# Patient Record
Sex: FEMALE | Race: WHITE | NOT HISPANIC OR LATINO
[De-identification: names, ages, dates, MRNs, and addresses within clinical notes are randomized per-mention and may not be internally consistent; named-entity substitution may affect disease eponyms.]

---

## 2020-10-07 ENCOUNTER — TRANSCRIPTION ENCOUNTER (OUTPATIENT)
Age: 25
End: 2020-10-07

## 2020-11-30 ENCOUNTER — TRANSCRIPTION ENCOUNTER (OUTPATIENT)
Age: 25
End: 2020-11-30

## 2021-01-01 ENCOUNTER — TRANSCRIPTION ENCOUNTER (OUTPATIENT)
Age: 26
End: 2021-01-01

## 2021-01-02 ENCOUNTER — EMERGENCY (EMERGENCY)
Facility: HOSPITAL | Age: 26
LOS: 0 days | Discharge: HOME | End: 2021-01-02
Attending: STUDENT IN AN ORGANIZED HEALTH CARE EDUCATION/TRAINING PROGRAM | Admitting: STUDENT IN AN ORGANIZED HEALTH CARE EDUCATION/TRAINING PROGRAM
Payer: MEDICAID

## 2021-01-02 VITALS
RESPIRATION RATE: 16 BRPM | OXYGEN SATURATION: 99 % | DIASTOLIC BLOOD PRESSURE: 55 MMHG | TEMPERATURE: 98 F | HEART RATE: 99 BPM | SYSTOLIC BLOOD PRESSURE: 140 MMHG

## 2021-01-02 DIAGNOSIS — M72.2 PLANTAR FASCIAL FIBROMATOSIS: ICD-10-CM

## 2021-01-02 DIAGNOSIS — M79.671 PAIN IN RIGHT FOOT: ICD-10-CM

## 2021-01-02 PROCEDURE — 73630 X-RAY EXAM OF FOOT: CPT | Mod: 26,RT

## 2021-01-02 PROCEDURE — 99283 EMERGENCY DEPT VISIT LOW MDM: CPT

## 2021-01-02 RX ORDER — IBUPROFEN 200 MG
1 TABLET ORAL
Qty: 15 | Refills: 0
Start: 2021-01-02 | End: 2021-01-06

## 2021-01-02 NOTE — ED PROVIDER NOTE - PATIENT PORTAL LINK FT
You can access the FollowMyHealth Patient Portal offered by [facility] by registering at the following website: [email or web address]. By joining [employer]’s FollowMyHealth portal, you will also be able to view your health information using other applications (apps) compatible with our system.

## 2021-01-02 NOTE — ED PROVIDER NOTE - OBJECTIVE STATEMENT
atraumatic right foot pain x 2 days. Pain worse in morning. Pain with wt bearing. No fever or swelling.

## 2021-01-02 NOTE — ED PROVIDER NOTE - WR INTERPRETATION 1
Exposed to covid on Friday. Then on Sunday, c/o headache, scratchy throat and chills. Denies fevers. Here for covid testing.
MSK XR negative - No fracture, No dislocation

## 2021-01-02 NOTE — ED ADULT TRIAGE NOTE - CHIEF COMPLAINT QUOTE
pt presents to E Dc/o right foot pain , denies ant trauma, small bruise noted to top of foot, pt states that is not where pain is c/o pain to left pinky toe an down , +pedal pulses

## 2021-01-02 NOTE — ED ADULT NURSE NOTE - NSIMPLEMENTINTERV_GEN_ALL_ED
Implemented All Universal Safety Interventions:  [place] to call system. Call bell, personal items and telephone within reach. Instruct patient to call for assistance. Room bathroom lighting operational. Non-slip footwear when patient is off stretcher. Physically safe environment: no spills, clutter or unnecessary equipment. Stretcher in lowest position, wheels locked, appropriate side rails in place.

## 2021-01-02 NOTE — ED ADULT NURSE NOTE - OBJECTIVE STATEMENT
patient presents to the emergency department with complaints of left foot pain. patient states happened sunday and radiates up to her knee. patient states she went to urgent care yesterday but does not know if an xray was taken. pt able to move foot and ankle but has weakness in moving pinky toe. denies allergies.

## 2021-01-02 NOTE — ED PROVIDER NOTE - NSFOLLOWUPCLINICS_GEN_ALL_ED_FT
[facility]  Podiatry  .  [place]   Phone: [phone]  Fax:   Follow Up Time: 4-6 Days

## 2021-01-02 NOTE — ED PROVIDER NOTE - ATTENDING CONTRIBUTION TO CARE
I personally evaluated the patient. I reviewed the Resident’s or Physician Assistant’s note (as assigned above), and agree with the findings and plan except as documented in my note.  25 year old female no pmh presents here c/o atraumatic right foot pain x 1 week. Patient states she woke up on sunday of last week and began having pain. Pain improves with rest but is worse upon waking up in the morning  and increases to 8/10 with walking. No fever or chills no trauma no numbness/tingling.  On exam  CONSTITUTIONAL: WA / WN / NAD  HEAD: NCAT  EYES: PERRL; EOMI;   MSK/EXT: No gross deformities; full range of motion. +ttp sole of right foot. No erythema no swelling no abrasions/lacerations.   SKIN: Warm and dry;   NEURO: AAOx3  PSYCH: Memory Intact, Normal Affect

## 2021-01-02 NOTE — ED PROVIDER NOTE - PHYSICAL EXAMINATION
CONST: Well appearing in NAD  MS: Right foot with plantar arch tenderness. No swelling or redness. There is one plantar wart also noted. NV intact. FROM  SKIN: Warm, dry, no acute rashes. Good turgor  NEURO: A&Ox3, No focal deficits. Strength 5/5 with no sensory deficits. Steady gait

## 2021-09-03 ENCOUNTER — TRANSCRIPTION ENCOUNTER (OUTPATIENT)
Age: 26
End: 2021-09-03

## 2022-04-16 ENCOUNTER — EMERGENCY (EMERGENCY)
Facility: HOSPITAL | Age: 27
LOS: 0 days | Discharge: HOME | End: 2022-04-17
Attending: STUDENT IN AN ORGANIZED HEALTH CARE EDUCATION/TRAINING PROGRAM | Admitting: STUDENT IN AN ORGANIZED HEALTH CARE EDUCATION/TRAINING PROGRAM
Payer: MEDICAID

## 2022-04-16 VITALS
TEMPERATURE: 97 F | SYSTOLIC BLOOD PRESSURE: 132 MMHG | OXYGEN SATURATION: 100 % | HEART RATE: 105 BPM | RESPIRATION RATE: 20 BRPM | WEIGHT: 149.91 LBS | DIASTOLIC BLOOD PRESSURE: 61 MMHG

## 2022-04-16 DIAGNOSIS — O23.41 UNSPECIFIED INFECTION OF URINARY TRACT IN PREGNANCY, FIRST TRIMESTER: ICD-10-CM

## 2022-04-16 DIAGNOSIS — R10.9 UNSPECIFIED ABDOMINAL PAIN: ICD-10-CM

## 2022-04-16 DIAGNOSIS — O99.891 OTHER SPECIFIED DISEASES AND CONDITIONS COMPLICATING PREGNANCY: ICD-10-CM

## 2022-04-16 DIAGNOSIS — M54.9 DORSALGIA, UNSPECIFIED: ICD-10-CM

## 2022-04-16 DIAGNOSIS — O21.9 VOMITING OF PREGNANCY, UNSPECIFIED: ICD-10-CM

## 2022-04-16 DIAGNOSIS — Z3A.08 8 WEEKS GESTATION OF PREGNANCY: ICD-10-CM

## 2022-04-16 LAB
ALBUMIN SERPL ELPH-MCNC: 4.2 G/DL — SIGNIFICANT CHANGE UP (ref 3.5–5.2)
ALP SERPL-CCNC: 115 U/L — SIGNIFICANT CHANGE UP (ref 30–115)
ALT FLD-CCNC: 46 U/L — HIGH (ref 0–41)
ANION GAP SERPL CALC-SCNC: 12 MMOL/L — SIGNIFICANT CHANGE UP (ref 7–14)
APPEARANCE UR: ABNORMAL
AST SERPL-CCNC: 23 U/L — SIGNIFICANT CHANGE UP (ref 0–41)
BACTERIA # UR AUTO: NEGATIVE — SIGNIFICANT CHANGE UP
BASOPHILS # BLD AUTO: 0.03 K/UL — SIGNIFICANT CHANGE UP (ref 0–0.2)
BASOPHILS NFR BLD AUTO: 0.3 % — SIGNIFICANT CHANGE UP (ref 0–1)
BILIRUB SERPL-MCNC: 0.3 MG/DL — SIGNIFICANT CHANGE UP (ref 0.2–1.2)
BILIRUB UR-MCNC: NEGATIVE — SIGNIFICANT CHANGE UP
BUN SERPL-MCNC: 10 MG/DL — SIGNIFICANT CHANGE UP (ref 10–20)
CALCIUM SERPL-MCNC: 9.3 MG/DL — SIGNIFICANT CHANGE UP (ref 8.5–10.1)
CHLORIDE SERPL-SCNC: 103 MMOL/L — SIGNIFICANT CHANGE UP (ref 98–110)
CO2 SERPL-SCNC: 21 MMOL/L — SIGNIFICANT CHANGE UP (ref 17–32)
COLOR SPEC: YELLOW — SIGNIFICANT CHANGE UP
CREAT SERPL-MCNC: 0.5 MG/DL — LOW (ref 0.7–1.5)
DIFF PNL FLD: NEGATIVE — SIGNIFICANT CHANGE UP
EGFR: 133 ML/MIN/1.73M2 — SIGNIFICANT CHANGE UP
EOSINOPHIL # BLD AUTO: 0.06 K/UL — SIGNIFICANT CHANGE UP (ref 0–0.7)
EOSINOPHIL NFR BLD AUTO: 0.5 % — SIGNIFICANT CHANGE UP (ref 0–8)
EPI CELLS # UR: 7 /HPF — HIGH (ref 0–5)
GLUCOSE SERPL-MCNC: 110 MG/DL — HIGH (ref 70–99)
GLUCOSE UR QL: NEGATIVE — SIGNIFICANT CHANGE UP
HCG SERPL-ACNC: HIGH MIU/ML
HCT VFR BLD CALC: 32 % — LOW (ref 37–47)
HGB BLD-MCNC: 10.5 G/DL — LOW (ref 12–16)
HYALINE CASTS # UR AUTO: 8 /LPF — HIGH (ref 0–7)
IMM GRANULOCYTES NFR BLD AUTO: 0.4 % — HIGH (ref 0.1–0.3)
KETONES UR-MCNC: SIGNIFICANT CHANGE UP
LEUKOCYTE ESTERASE UR-ACNC: ABNORMAL
LYMPHOCYTES # BLD AUTO: 2.28 K/UL — SIGNIFICANT CHANGE UP (ref 1.2–3.4)
LYMPHOCYTES # BLD AUTO: 20.3 % — LOW (ref 20.5–51.1)
MCHC RBC-ENTMCNC: 24.9 PG — LOW (ref 27–31)
MCHC RBC-ENTMCNC: 32.8 G/DL — SIGNIFICANT CHANGE UP (ref 32–37)
MCV RBC AUTO: 76 FL — LOW (ref 81–99)
MONOCYTES # BLD AUTO: 0.68 K/UL — HIGH (ref 0.1–0.6)
MONOCYTES NFR BLD AUTO: 6.1 % — SIGNIFICANT CHANGE UP (ref 1.7–9.3)
NEUTROPHILS # BLD AUTO: 8.13 K/UL — HIGH (ref 1.4–6.5)
NEUTROPHILS NFR BLD AUTO: 72.4 % — SIGNIFICANT CHANGE UP (ref 42.2–75.2)
NITRITE UR-MCNC: NEGATIVE — SIGNIFICANT CHANGE UP
NRBC # BLD: 0 /100 WBCS — SIGNIFICANT CHANGE UP (ref 0–0)
PH UR: 6 — SIGNIFICANT CHANGE UP (ref 5–8)
PLATELET # BLD AUTO: 258 K/UL — SIGNIFICANT CHANGE UP (ref 130–400)
POTASSIUM SERPL-MCNC: 3.7 MMOL/L — SIGNIFICANT CHANGE UP (ref 3.5–5)
POTASSIUM SERPL-SCNC: 3.7 MMOL/L — SIGNIFICANT CHANGE UP (ref 3.5–5)
PROT SERPL-MCNC: 7 G/DL — SIGNIFICANT CHANGE UP (ref 6–8)
PROT UR-MCNC: ABNORMAL
RBC # BLD: 4.21 M/UL — SIGNIFICANT CHANGE UP (ref 4.2–5.4)
RBC # FLD: 15.1 % — HIGH (ref 11.5–14.5)
RBC CASTS # UR COMP ASSIST: 6 /HPF — HIGH (ref 0–4)
SODIUM SERPL-SCNC: 136 MMOL/L — SIGNIFICANT CHANGE UP (ref 135–146)
SP GR SPEC: 1.03 — HIGH (ref 1.01–1.03)
UROBILINOGEN FLD QL: ABNORMAL
WBC # BLD: 11.23 K/UL — HIGH (ref 4.8–10.8)
WBC # FLD AUTO: 11.23 K/UL — HIGH (ref 4.8–10.8)
WBC UR QL: 12 /HPF — HIGH (ref 0–5)

## 2022-04-16 PROCEDURE — 99285 EMERGENCY DEPT VISIT HI MDM: CPT

## 2022-04-16 NOTE — ED PROVIDER NOTE - PATIENT PORTAL LINK FT
You can access the FollowMyHealth Patient Portal offered by [facility] by registering at the following website: [email or web address]. By joining [employer]’s FollowMyHealth portal, you will also be able to view your health information using other applications (apps) compatible with our system.

## 2022-04-16 NOTE — ED PROVIDER NOTE - PHYSICAL EXAMINATION
CONSTITUTIONAL: Well-developed; well-nourished; in no acute distress.   SKIN: warm, dry.  HEAD: Normocephalic; atraumatic.  EYES: PERRLA, EOMI, no conjunctival erythema.  ENT: No nasal discharge; airway clear. MMM.  NECK: Supple; non tender.  CARD: S1, S2 normal; no murmurs, gallops, or rubs. Regular rate and rhythm.   RESP: No wheezes, rales, or rhonchi. Lungs CTAB.  ABD: soft, +mild b/l lower abdominal ttp. No rebound or guarding. No CVAT b/l.  EXT: Normal ROM. No clubbing, cyanosis, or edema.  NEURO: Alert, oriented, grossly unremarkable.

## 2022-04-16 NOTE — ED PROVIDER NOTE - CARE PROVIDER_API CALL
Cintia Arcos  Obstetrics & Gynecology  [address] [place] [address]  Phone: [phone]  Fax: [phone]  Follow Up Time: 4-6 Days

## 2022-04-16 NOTE — ED PROVIDER NOTE - NSFOLLOWUPCLINICS_GEN_ALL_ED_FT
[facility]  OB/GYN  [address] [place] [address]  Phone: [phone]  Fax:

## 2022-04-16 NOTE — ED PROVIDER NOTE - NSFOLLOWUPINSTRUCTIONS_ED_ALL_ED_FT
Abdominal Pain, Adult  Abdominal pain can be caused by many things. Often, abdominal pain is not serious and it gets better with no treatment or by being treated at home. However, sometimes abdominal pain is serious. Your health care provider will do a medical history and a physical exam to try to determine the cause of your abdominal pain.    Follow these instructions at home:  Take over-the-counter and prescription medicines only as told by your health care provider. Do not take a laxative unless told by your health care provider.  ImageDrink enough fluid to keep your urine clear or pale yellow.  Watch your condition for any changes.  Keep all follow-up visits as told by your health care provider. This is important.  Contact a health care provider if:  Your abdominal pain changes or gets worse.  You are not hungry or you lose weight without trying.  You are constipated or have diarrhea for more than 2–3 days.  You have pain when you urinate or have a bowel movement.  Your abdominal pain wakes you up at night.  Your pain gets worse with meals, after eating, or with certain foods.  You are throwing up and cannot keep anything down.  You have a fever.  Get help right away if:  Your pain does not go away as soon as your health care provider told you to expect.  You cannot stop throwing up.  Your pain is only in areas of the abdomen, such as the right side or the left lower portion of the abdomen.  You have bloody or black stools, or stools that look like tar.  You have severe pain, cramping, or bloating in your abdomen.  You have signs of dehydration, such as:    Dark urine, very little urine, or no urine.  Cracked lips.  Dry mouth.  Sunken eyes.  Sleepiness.  Weakness.    This information is not intended to replace advice given to you by your health care provider. Make sure you discuss any questions you have with your health care provider.      Urinary Tract Infection    A urinary tract infection (UTI) is an infection of any part of the urinary tract, which includes the kidneys, ureters, bladder, and urethra. Risk factors include ignoring your need to urinate, wiping back to front if female, being an uncircumcised male, and having diabetes or a weak immune system. Symptoms include frequent urination, pain or burning with urination, foul smelling urine, cloudy urine, pain in the lower abdomen, blood in the urine, and fever. If you were prescribed an antibiotic medicine, take it as told by your health care provider. Do not stop taking the antibiotic even if you start to feel better.    SEEK IMMEDIATE MEDICAL CARE IF YOU HAVE THE FOLLOWING SYMPTOMS: severe back or abdominal pain, inability to keep fluids or medicine down, dizziness/lightheadedness, or a change in mental status.

## 2022-04-16 NOTE — ED PROVIDER NOTE - CLINICAL SUMMARY MEDICAL DECISION MAKING FREE TEXT BOX
26 year old female  LMP 22 9 weeks pregnant follows with Dr. Dale as her obgyn presents here c.o lower abdominal pain and back pain that began today. Lower abdominal pain is 8/10 non radiating. Has been having nausea vomiting during this pregnancy. No fever chills cough sob urinary frequency urgency burning vaginal discharge or bleeding. Was seen by her OBGYN 2 weeks ago because she had this pain at that time and was given intravaginal progsterone pills. VS reviewed labs imaging obtained and reviewed Single live intrauterine pregnancy corresponding to a gestational age of   8 weeks and 4 days.Fetal heart rate at 175 beats/min.Small subchorionic fluid collection likely subchorionic hematoma measuring approximately 1.6 x 0.8 x 2.2 cm 2.4 x 2.6 x 2.4 cm right ovarian cyst demonstrating internal lacy echoes likely hemorrhagic cyst. OBGYN consulted , recommended no acute gyn intervention, Discussed need for PO hydration and counseled patient on lifestyle modifications for nausea/vomiting in pregnancy including small bland meals throughout day, stella.  Pain and bleeding precautions discussed. Patient a spoken to in detail about results  All questions addressed.  Results of ED work up discussed and patient given a copy of the results. Patient has proper follow up. Return precautions given.

## 2022-04-16 NOTE — ED PROVIDER NOTE - OBJECTIVE STATEMENT
26 year old female  pt states she is currently 9 weeks pregnant presenting to the ED for lower abdominal cramping/pain for the past two to three days. Denies any vaginal bleeding or discharge. The pain has been intermittent. OBGYN is Dr. Arcos. Had similar episode a few weeks ago for which she received vaginal progesterone. Denies any fevers/chills, weakness, dizziness, sob, cp, or back pain. Denies hematuria or dysuria. 26 year old female  pt states she is currently 9 weeks pregnant presenting to the ED for lower abdominal pain and back pain x1 day. Denies any vaginal bleeding or discharge. The pain has been intermittent. OBGYN is Dr. Arcos. Had similar episode a few weeks ago for which she received vaginal progesterone. Denies any fevers/chills, weakness, dizziness, sob, cp, or back pain. Denies hematuria or dysuria.

## 2022-04-16 NOTE — ED PROVIDER NOTE - ATTENDING CONTRIBUTION TO CARE
26 year old female  LMP 22 9 weeks pregnant follows with Dr. Dale as her obgyn presents here c.o lower abdominal pain and back pain that began today. Lower abdominal pain is 8/10 non radiating. Has been having nausea vomiting during this pregnancy. No fever chills cough sob urinary frequency urgency burning vaginal discharge or bleeding. Was seen by her OBGYN 2 weeks ago because she had this pain at that time and was given intravaginal progsterone pills.  on exam  CONSTITUTIONAL: WA / WN / NAD  HEAD: NCAT  EYES: PERRL; EOMI;   ENT: Normal pharynx; mucous membranes pink/moist, no erythema.  NECK: Supple; no meningeal signs  CARD: RRR; nl S1/S2; no M/R/G.   RESP: Respiratory rate and effort are normal; breath sounds clear and equal bilaterally.  ABD: Soft, ND + lower abdominal ttp.   MSK/EXT: No gross deformities; full range of motion.  SKIN: Warm and dry;   NEURO: AAOx3  PSYCH: Memory Intact, Normal Affect

## 2022-04-16 NOTE — ED ADULT NURSE NOTE - OBJECTIVE STATEMENT
Pt came to ED c/o abdominal pain for 2 days. Denies bleeding. Pt is 9 weeks pregnant. Pt is a/o/4. No SOB/CP, NSR. Denies n/v/d, no legs swelling.

## 2022-04-16 NOTE — ED ADULT NURSE NOTE - BREATHING, MLM
ED Cardiac





- General


Chief Complaint: Chest Pain


Stated Complaint: CHEST PAIN


Time Seen by Provider: 09/13/17 11:21


Mode of Arrival: Ambulatory


Notes: 





Patient says that she has been experiencing pain in the upper anterior, 

slightly to the right, chest that goes and comes over the past month.  It got 

worse today.  She says it went around to the entire right chest into her back.  

She has tried taking ibuprofen without much relief.  She works as a  

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  Does not recall 

an unusual activity or fall or other injury to that area of her chest.  She 

does have some problems occasionally with acid reflux.  Has nausea but not 

vomiting.  Denies any difficulty breathing, shortness of breath, or other 

respiratory problem.  Denies any fever.  No history of leg pain or swelling or 

suggestion of blood clots.  Denies stress or anxiety.





No history of heart disease.  No history of hypertension, diabetes, etc.  Non-

smoker.


TRAVEL OUTSIDE OF THE [place] IN LAST 30 DAYS: No





- Related Data


Allergies/Adverse Reactions: 


 





No Known Allergies Allergy (Unverified 09/13/17 11:07)


 











Past Medical History





- General


Information source: Patient





- Social History


Smoking Status: Never Smoker


Frequency of alcohol use: None


Drug Abuse: None


Family History: Reviewed & Not Pertinent





- Past Medical History


Cardiac Medical History: 


   Denies: Hx Coronary Artery Disease, Hx DVT, Hx Heart Attack


Past Surgical History: Reports: None





Review of Systems





- Review of Systems


Notes: 





REVIEW OF SYSTEMS:


CONSTITUTIONAL :  Denies fever.  


EENT:   Denies eye, ear, nose or mouth or throat pain or other symptoms.


CARDIOVASCULAR: See HPI.


RESPIRATORY:  Denies cough, chest congestion, or shortness of breath.


GASTROINTESTINAL:  Denies abdominal pain or nausea, vomiting, or diarrhea.


GENITOURINARY:  Denies difficulty or painful urinating, urinary frequency, 

blood in urine.


MUSCULOSKELETAL:  Denies back or neck pain.  Denies joint pain or swelling.


SKIN:   Denies rash or skin lesions.


NEUROLOGICAL:  Denies LOC or altered mental status.  Denies headache.  Denies 

sensory loss or motor deficits.





ALL OTHER SYSTEMS REVIEWED AND NEGATIVE.





Physical Exam





- Vital signs


Vitals: 


 











Temp Pulse Resp BP Pulse Ox


 


 98.5 F   73   16   122/80   98 


 


 09/13/17 11:07  09/13/17 11:07  09/13/17 11:07  09/13/17 11:07  09/13/17 11:07











Interpretation: Normal





- Notes


Notes: 





PHYSICAL EXAMINATION:





GENERAL: Well-appearing, in no acute distress.





HEAD: Atraumatic, normocephalic.





ENT: oropharynx clear without exudates.  Moist mucous membranes.





NECK: Normal range of motion, supple.





LUNGS: Breath sounds clear and equal bilaterally.  Patient actually has some 

tenderness to palpation and pressing on the right side of the sternum at the 

junction of the ribs to the right side of the sternum.  No swelling.  No 

redness.





HEART: Regular rate and rhythm without murmurs.





ABDOMEN: Soft, nontender.  No guarding or rebound.





BACK:  No tenderness throughout entire back.





EXTREMITIES: Normal range of motion without pain.  No evidence of clots.  

Negative Homans.





NEUROLOGICAL:  Normal speech, normal gait.  Normal sensory, motor, and reflex 

exams.  Awake, alert, and oriented x3.  Cranial nerves normal.





PSYCH: Normal mood, normal affect.





SKIN: Warm, dry, no rashes.





Course





- Vital Signs


Vital signs: 


 











Temp Pulse Resp BP Pulse Ox


 


 98.5 F   66   17   129/89 H  98 


 


 09/13/17 14:14  09/13/17 14:14  09/13/17 14:14  09/13/17 14:14  09/13/17 14:14














- Laboratory


Result Diagrams: 


 09/13/17 12:48





 09/13/17 11:51


Laboratory results interpreted by me: 


 











  09/13/17





  12:48


 


RBC  3.61 L


 


Hgb  11.6 L


 


Hct  33.9 L














Discharge





- Discharge


Clinical Impression: 


 Chest pain, non-cardiac, Muscle strain of chest wall





Condition: Stable


Disposition: HOME, SELF-CARE


Additional Instructions: 


CHEST PAIN OF UNCLEAR CAUSE:





     The exact cause of your chest pain isn't clear.  Fortunately, there is no 

evidence of a dangerous medical condition.  Further testing may be required to 

find the source of the pain.


     Most often, we find that this pain is coming from the chest wall -- the 

muscles or rib joints in the chest.  But chest pain can come from the lung and 

lung lining, the esophagus, the heart valves or heart lining, and even the 

stomach or gallbladder.


     Rest.  Eat lightly until the pain is gone.  We may prescribe medicine for 

pain and inflammation.


     You should call the physician immediately if the pain radiates to the 

shoulder, jaw or arms; if you start to run a fever or develop a cough; or if 

you develop shortness of breath, or other new or alarming symptoms.








NORMAL EXAM AND WORKUP:


     At this time, your examination and workup show no significant abnormality.

  No significant abnormal physical findings were noted.  All laboratory, EKG, 

and imaging (x-ray, CT scans, ultrasound) studies that were ordered show no 

significant abnormality.


     Although your examination and all studies that were ordered showed no 

significant abnormal finding, there are no examinations and no studies that are 

100% accurate.  There is always the possibility that some abnormality could 

exist and not be detected with physical examination or within the limits and 

capabilities of laboratory and other studies.


     You should return or follow up as you were instructed on your visit today 

for further evaluation if your symptoms do not resolve.








CHEST WALL PAIN:


     Your chest pain may be coming from the chest wall. This is often caused by 

straining the muscles or joints in the chest during physical activity, direct 

trauma, coughing, or vigorous vomiting.  Persons with arthritis are especially 

prone to this type of pain, due to inflammation of the cartilage joints near 

the breast bone. Occasionally, no cause can be found.


     Rest from strenuous physical activity.  This kind of chest pain is usually 

made worse by movement of the chest.  Depending on the symptoms, we may 

prescribe medicine for pain, muscle relaxation, and antiinflammatory effects.


     If the pain is new, and seems to be due to muscle strain, cold packs can 

help.  Otherwise, apply gentle warmth to the painful area for 15 minutes every 

hour or two.


     You should call contact the doctor immediately if things change. Further 

evaluation is needed if you develop a fever or cough, if the nature of the pain 

changes, or if you become short of breath.








ACID REFLUX DISEASE (GERD) is a possibility:


     Gastro-Esophageal Reflux Disease (GERD) is caused by stomach acid 

refluxing back up into the esophagus. The valve at the end of the esophagus may 

be weak. This is common in persons with a hiatal hernia. GERD symptoms can 

include indigestion, chest pain, heartburn, or food "sticking." Certain foods, 

alcohol, and aspirin can make GERD worse.


     Treatment depends on the severity. Usually, antacids or acid-suppressing 

medicines are used. When the esophagus is acutely inflamed, the physician will 

often prescribe membrane-protective drugs such as Carafate.  Some patients 

benefit from medication such as Reglan that tightens the valve at the top of 

the stomach.


     Avoid those foods that bring on your symptoms. For many people, these 

foods are coffee, chocolate, onions, garlic, and carbonated drinks. Don't use 

alcohol, aspirin, caffeine, or tobacco. Don't eat late at night -- within 4 

hours of bedtime. Don't over-eat. If necessary, elevate the head of your bed 

about 4 inches so that stomach acid will not roll up into your esophagus.


     Call the doctor if you develop severe chest pain, inability to swallow 

fluids, fever, or worsening symptoms.








ANTACID THERAPY:


     You have been instructed to start antacid therapy.  Antacids directly 

neutralize stomach acid.  This is useful for acid irritation of the esophagus, 

gastritis, and ulcers.


     You should take two tablespoons of antacid one hour after each meal and 

three hours after each meal.  If you are not eating, take the antacid every two 

hours.  If you are using a concentrate (such as Maalox TC), use only one 

tablespoon.


     Many antacids affect the bowels.  The most common problem is diarrhea.  In 

this case, a pure aluminum hydroxide antacid (such as AlternaGel) can be 

substituted for some or all doses.  If the problem is constipation, add a 

teaspoon of Milk of Magnesia to each dose.


     Call the doctor if you experience continued diarrhea or constipation, or 

if you develop lightheadedness, bloody stool or vomitus, severe abdominal pain, 

or black stool.








PRILOSEC (ACID PUMP INHIBITOR): 


     Prilosec (omeprazole) is an acid-pump inhibitor.  It blocks the secretion 

of hydrogen ions in the acid-producing cells of the stomach. Prilosec keeps 

your stomach from making acid.


     Take all medication as prescribed, even after the pain is gone. Regular 

antacids may be added as needed if you have symptoms while taking this medicine.


     There are usually no side effects from this medication.  Contact your 

doctor if there is fever, rash, yellow skin color, increasing abdominal pain, 

weakness, or unusual bruising.


     Return at once if you develop lightheadedness, black or bloody stool, or 

bloody vomitus.


You can purchase Prilosec over-the-counter.





Muscle Relaxers





     Muscle relaxing medications are usually prescribed for acute muscle spasm 

or injury to the neck and back.  They are often combined with antiinflammatory 

pain medication for increased relief.


     You may stop the muscle relaxer when the pain and stiffness have improved.

  Start the medication again if spasms recur.  


     Muscle relaxers may cause drowsiness, especially with the first dose.  Do 

not operate machinery or drive while under the effects of the medication.  Most 

muscle relaxers last up to 24 hours.  Do not combine the medication with 

alcohol.





Ibuprofen





     Ibuprofen is an excellent, safe drug for pain control.  In addition, it 

has potent antiinflammatory effects which are beneficial, especially in the 

treatment of injuries, arthritis, or tendonitis. It's best to take ibuprofen 

with food.  Persons with ulcer disease or allergy to aspirin should notify 

their physician of this before taking ibuprofen.


     Take the medication exactly as prescribed.  Don't take additional doses 

unless instructed to do so by your doctor.  If you develop wheezing, shortness 

of breath, hives, faintness, stomach pain, vomiting, or dark black stools, 

return for re-evaluation at once.





USE OF ACETAMINOPHEN (Tylenol):


     Acetaminophen may be taken for pain relief or fever control. It's much 

safer than aspirin, offering a wider range of "safe" dosages.  It is safe 

during pregnancy.  Some brand names are Tylenol, Panadol, Datril, Anacin 3, 

Tempra, and Liquiprin. Acetaminophen can be repeated every four hours.  The 

following are maximum recommended dosages:





WEIGHT         Dose             Drops                  Elixir        Chewable(

80mg)


(LBS.)                            drprs=droppers    tsp=teaspoon


>89 pounds or adults          650 mg to 900 mg





Acetaminophen can be repeated every four hours.  Maximum dose not to exceed 

4000 mg a day.





   These maximum recommended dosages are slightly higher than the dosages 

written on the product container, but these dosages are very safe and below the 

toxic dosage for acetaminophen.





FOLLOW-UP CARE:


If you have been referred to a physician for follow-up care, call the physician

s office for an appointment as you were instructed or within the next two days.

  If you experience worsening or a significant change in your symptoms, notify 

the physician immediately or return to the Emergency Department at any time for 

re-evaluation.








Prescriptions: 


Cyclobenzaprine HCl [Flexeril 5 mg Tablet] 5 mg PO TIDP PRN #20 tablet


 PRN Reason: 


Referrals: 


ROBERT GONZALES MD [Primary Care Provider] - Follow up as needed Spontaneous, unlabored and symmetrical

## 2022-04-16 NOTE — ED ADULT NURSE NOTE - NSIMPLEMENTINTERV_GEN_ALL_ED
Implemented All Universal Safety Interventions:  [place] to call system. Call bell, personal items and telephone within reach. Instruct patient to call for assistance. Room bathroom lighting operational. Non-slip footwear when patient is off stretcher. Physically safe environment: no spills, clutter or unnecessary equipment. Stretcher in lowest position, wheels locked, appropriate side rails in place.

## 2022-04-17 VITALS — SYSTOLIC BLOOD PRESSURE: 114 MMHG | DIASTOLIC BLOOD PRESSURE: 65 MMHG | HEART RATE: 74 BPM

## 2022-04-17 LAB — BLD GP AB SCN SERPL QL: SIGNIFICANT CHANGE UP

## 2022-04-17 PROCEDURE — 76830 TRANSVAGINAL US NON-OB: CPT | Mod: 26

## 2022-04-17 RX ORDER — NITROFURANTOIN MACROCRYSTAL 50 MG
1 CAPSULE ORAL
Qty: 10 | Refills: 0
Start: 2022-04-17 | End: 2022-04-21

## 2022-04-17 RX ORDER — ACETAMINOPHEN 500 MG
975 TABLET ORAL ONCE
Refills: 0 | Status: COMPLETED | OUTPATIENT
Start: 2022-04-17 | End: 2022-04-17

## 2022-04-17 RX ADMIN — Medication 975 MILLIGRAM(S): at 01:25

## 2022-04-17 NOTE — CONSULT NOTE ADULT - SUBJECTIVE AND OBJECTIVE BOX
OBYGN PGY2    Chief Complaint: abdominal pain     HPI:       Location -  Severity -  Quality -  Duration -  Timing -   Modifying Factors -   Associated Signs/Symptoms -     Ob/Gyn History:  G P                 LMP -                   Cycle Length -   Denies history of ovarian cysts, uterine fibroids, abnormal paps, or STIs  Last Pap Smear -   Last Mammogram -   Last Colonoscopy -        Denies the following: constitutional symptoms, visual symptoms, cardiovascular symptoms, respiratory symptoms, GI symptoms, musculoskeletal symptoms, skin symptoms, neurologic symptoms, hematologic symptoms, allergic symptoms, psychiatric symptoms  Except any pertinent positives listed.     Home Medications:      Allergies    No Known Allergies    Intolerances        PAST MEDICAL & SURGICAL HISTORY:      FAMILY HISTORY:      SOCIAL HISTORY: Denies cigarette use, alcohol use, or illicit drug use    Vital Signs Last 24 Hrs  T(F): 97.4 (2022 21:26), Max: 97.4 (2022 21:26)  HR: 105 (2022 21:26) (105 - 105)  BP: 132/61 (2022 21:26) (132/61 - 132/61)  RR: 20 (2022 21:26) (20 - 20)    General Appearance - AAOx3, NAD  Heart - S1S2 regular rate and rhythm  Lung - CTA Bilaterally  Abdomen - Soft, nontender, nondistended, no rebound, no rigidity, no guarding, bowel sounds present    GYN/Pelvis:  External genitalia:  Vagina:  Cervix:  Uterus:  Adnexa:    LABS:                        10.5   11.23 )-----------( 258      ( 2022 21:44 )             32.0     HCG Quantitative, Serum: 594236.0 mIU/mL (22 @ 21:44)          136  |  103  |  10  ----------------------------<  110<H>  3.7   |  21  |  0.5<L>    Ca    9.3      2022 21:44    TPro  7.0  /  Alb  4.2  /  TBili  0.3  /  DBili  x   /  AST  23  /  ALT  46<H>  /  AlkPhos  115        Urinalysis Basic - ( 2022 21:44 )    Color: Yellow / Appearance: Slightly Turbid / S.032 / pH: x  Gluc: x / Ketone: Trace  / Bili: Negative / Urobili: 3 mg/dL   Blood: x / Protein: 30 mg/dL / Nitrite: Negative   Leuk Esterase: Small / RBC: 6 /HPF / WBC 12 /HPF   Sq Epi: x / Non Sq Epi: 7 /HPF / Bacteria: Negative          RADIOLOGY & ADDITIONAL STUDIES:    r< from: US Transvaginal (22 @ 00:07) >    ACC: [id number] EXAM:  US TRANSVAGINAL                          PROCEDURE DATE:  2022          INTERPRETATION:  CLINICAL HISTORY / REASON FOR EXAM: Abdominal pain..    TECHNIQUE: Ultrasound of the pelvis was performed with transvaginal   approach, including Doppler.    LMP: 2022.    FINDINGS:    The uterus measures 9.7 x 5.7 x 6.0 cm containing a single intrauterine   pregnancy with crown-rump length  measuring 1.98 cm corresponding to a   gestational age of 8 weeks and 4 days.  Fetal heart rate is 175   beats/min. A yolk sac is visualized. Small subchorionic fluid collection   likely subchorionic hematoma measuring approximately 1.6 x 0.8 x 2.2 cm    There is no adnexal mass or free pelvic fluid.    The right ovary measures 4.2 x 3.1 x 3.1 cm with a 2.4 x 2.6 x 2.4 cm   cyst demonstrating internal echoes likely hemorrhagic cyst. The left   ovary measures 2.9 x 1.2 x 2.3 cm.    Doppler flow is demonstrated to both ovaries.    IMPRESSION:    Single live intrauterine pregnancy corresponding to a gestational age of   8 weeks and 4 days.    Fetal heart rate at 175 beats/min.    Small subchorionic fluid collection likely subchorionic hematoma   measuring approximately 1.6 x 0.8 x 2.2 cm    2.4 x 2.6 x 2.4 cm right ovarian cyst demonstrating internal lacy echoes   likely hemorrhagic cyst.    --- End of Report ---            LAILA DAS MD; Attending Radiologist  This document has been electronically signed. 2022  1:02AM    < end of copied text >     OBYGN PGY2    Chief Complaint: abdominal pain     HPI:   25 yo   @8w6d by LMP on 22      Ob/Gyn History:  G P                 LMP -                   Cycle Length -   Denies history of ovarian cysts, uterine fibroids, abnormal paps, or STIs    Denies the following: constitutional symptoms, visual symptoms, cardiovascular symptoms, respiratory symptoms, GI symptoms, musculoskeletal symptoms, skin symptoms, neurologic symptoms, hematologic symptoms, allergic symptoms, psychiatric symptoms  Except any pertinent positives listed.     Home Medications:  none    Allergies:  No Known Allergies      PAST MEDICAL & SURGICAL HISTORY:  none    FAMILY HISTORY:  none    SOCIAL HISTORY: Denies cigarette use, alcohol use, or illicit drug use    Vital Signs Last 24 Hrs  T(F): 97.4 (2022 21:26), Max: 97.4 (2022 21:26)  HR: 105 (2022 21:26) (105 - 105)  BP: 132/61 (2022 21:26) (132/61 - 132/61)  RR: 20 (2022 21:26) (20 - 20)    General Appearance - AAOx3, NAD  Heart - S1S2 regular rate and rhythm  Lung - CTA Bilaterally  Abdomen - Soft, nontender, nondistended, no rebound, no rigidity, no guarding, bowel sounds present    GYN/Pelvis:  External genitalia:  Vagina:  Cervix:  Uterus:  Adnexa:    LABS:                        10.5   11.23 )-----------( 258      ( 2022 21:44 )             32.0     HCG Quantitative, Serum: 652097.0 mIU/mL (22 @ 21:44)          136  |  103  |  10  ----------------------------<  110<H>  3.7   |  21  |  0.5<L>    Ca    9.3      2022 21:44    TPro  7.0  /  Alb  4.2  /  TBili  0.3  /  DBili  x   /  AST  23  /  ALT  46<H>  /  AlkPhos  115        Urinalysis Basic - ( 2022 21:44 )    Color: Yellow / Appearance: Slightly Turbid / S.032 / pH: x  Gluc: x / Ketone: Trace  / Bili: Negative / Urobili: 3 mg/dL   Blood: x / Protein: 30 mg/dL / Nitrite: Negative   Leuk Esterase: Small / RBC: 6 /HPF / WBC 12 /HPF   Sq Epi: x / Non Sq Epi: 7 /HPF / Bacteria: Negative          RADIOLOGY & ADDITIONAL STUDIES:    r< from: US Transvaginal (22 @ 00:07) >    ACC: [id number] EXAM:  US TRANSVAGINAL                          PROCEDURE DATE:  2022          INTERPRETATION:  CLINICAL HISTORY / REASON FOR EXAM: Abdominal pain..    TECHNIQUE: Ultrasound of the pelvis was performed with transvaginal   approach, including Doppler.    LMP: 2022.    FINDINGS:    The uterus measures 9.7 x 5.7 x 6.0 cm containing a single intrauterine   pregnancy with crown-rump length  measuring 1.98 cm corresponding to a   gestational age of 8 weeks and 4 days.  Fetal heart rate is 175   beats/min. A yolk sac is visualized. Small subchorionic fluid collection   likely subchorionic hematoma measuring approximately 1.6 x 0.8 x 2.2 cm    There is no adnexal mass or free pelvic fluid.    The right ovary measures 4.2 x 3.1 x 3.1 cm with a 2.4 x 2.6 x 2.4 cm   cyst demonstrating internal echoes likely hemorrhagic cyst. The left   ovary measures 2.9 x 1.2 x 2.3 cm.    Doppler flow is demonstrated to both ovaries.    IMPRESSION:    Single live intrauterine pregnancy corresponding to a gestational age of   8 weeks and 4 days.    Fetal heart rate at 175 beats/min.    Small subchorionic fluid collection likely subchorionic hematoma   measuring approximately 1.6 x 0.8 x 2.2 cm    2.4 x 2.6 x 2.4 cm right ovarian cyst demonstrating internal lacy echoes   likely hemorrhagic cyst.    --- End of Report ---            LAILA DAS MD; Attending Radiologist  This document has been electronically signed. 2022  1:02AM    < end of copied text >     OBYGN PGY2    Chief Complaint: abdominal pain     HPI:   27 yo  @8w6d by LMP on 22, presented to ED for lower abdominal pain x3w, cramping in nature, rated 8 out of 10 in intensity, comes and goes, improved with tylenol. Denies current vaginal bleeding or leakage of fluid. Had 1 prior episode of light bleeding a few weeks ago, however no bleeding since. Sees  for this pregnancy. Currently on vaginal progesterone. Reports nausea and vomiting for several weeks during pregnancy, however able to tolerate PO. Reports fasting for Ramadan for past 15 days, although fasting was discouraged during pregnancy by . Denies fever, chills, nausea, vomiting. Denies dysuria, hematuria, increased urinary frequency, or urgency. Denies chest pain, SOB, cough.     Ob/Gyn History:                   LMP - 22                   FT NSVDx1, no complications  Denies history of ovarian cysts, uterine fibroids, abnormal paps, or STIs    Denies the following: constitutional symptoms, visual symptoms, cardiovascular symptoms, respiratory symptoms, GI symptoms, musculoskeletal symptoms, skin symptoms, neurologic symptoms, hematologic symptoms, allergic symptoms, psychiatric symptoms  Except any pertinent positives listed.     Home Medications:  none    Allergies:  No Known Allergies      PAST MEDICAL & SURGICAL HISTORY:  none    FAMILY HISTORY:  Diabetes Mellitus (mother, father)    SOCIAL HISTORY: Denies cigarette use, alcohol use, or illicit drug use    Vital Signs Last 24 Hrs  T(F): 97.4 (2022 21:26), Max: 97.4 (2022 21:26)  HR: 105 (2022 21:26) (105 - 105)  BP: 132/61 (2022 21:26) (132/61 - 132/61)  RR: 20 (2022 21:26) (20 - 20)    General Appearance - AAOx3, NAD  Heart - S1S2 regular rate and rhythm  Lung - CTA Bilaterally  Abdomen - Soft, mild lower abdominal tenderness bilaterally, nondistended, no rebound, no rigidity, no guarding, bowel sounds present    GYN/Pelvis:  External genitalia: normal, no lesions  Vagina: no bleeding, no abnormal discharge  Cervix: closed, negative CMT  Uterus: no fundal tenderness  Adnexa: no adnexal fullness or tenderness palpated     LABS:                        10.5   11.23 )-----------( 258      ( 2022 21:44 )             32.0     HCG Quantitative, Serum: 133621.0 mIU/mL (22 @ 21:44)          136  |  103  |  10  ----------------------------<  110<H>  3.7   |  21  |  0.5<L>    Ca    9.3      2022 21:44    TPro  7.0  /  Alb  4.2  /  TBili  0.3  /  DBili  x   /  AST  23  /  ALT  46<H>  /  AlkPhos  115        Urinalysis Basic - ( 2022 21:44 )    Color: Yellow / Appearance: Slightly Turbid / S.032 / pH: x  Gluc: x / Ketone: Trace  / Bili: Negative / Urobili: 3 mg/dL   Blood: x / Protein: 30 mg/dL / Nitrite: Negative   Leuk Esterase: Small / RBC: 6 /HPF / WBC 12 /HPF   Sq Epi: x / Non Sq Epi: 7 /HPF / Bacteria: Negative          RADIOLOGY & ADDITIONAL STUDIES:    r< from: US Transvaginal (22 @ 00:07) >    ACC: [id number] EXAM:  US TRANSVAGINAL                          PROCEDURE DATE:  2022          INTERPRETATION:  CLINICAL HISTORY / REASON FOR EXAM: Abdominal pain..    TECHNIQUE: Ultrasound of the pelvis was performed with transvaginal   approach, including Doppler.    LMP: 2022.    FINDINGS:    The uterus measures 9.7 x 5.7 x 6.0 cm containing a single intrauterine   pregnancy with crown-rump length  measuring 1.98 cm corresponding to a   gestational age of 8 weeks and 4 days.  Fetal heart rate is 175   beats/min. A yolk sac is visualized. Small subchorionic fluid collection   likely subchorionic hematoma measuring approximately 1.6 x 0.8 x 2.2 cm    There is no adnexal mass or free pelvic fluid.    The right ovary measures 4.2 x 3.1 x 3.1 cm with a 2.4 x 2.6 x 2.4 cm   cyst demonstrating internal echoes likely hemorrhagic cyst. The left   ovary measures 2.9 x 1.2 x 2.3 cm.    Doppler flow is demonstrated to both ovaries.    IMPRESSION:    Single live intrauterine pregnancy corresponding to a gestational age of   8 weeks and 4 days.    Fetal heart rate at 175 beats/min.    Small subchorionic fluid collection likely subchorionic hematoma   measuring approximately 1.6 x 0.8 x 2.2 cm    2.4 x 2.6 x 2.4 cm right ovarian cyst demonstrating internal lacy echoes   likely hemorrhagic cyst.    --- End of Report ---            LAILA DAS MD; Attending Radiologist  This document has been electronically signed. 2022  1:02AM    < end of copied text >     OBYGN PGY2    Chief Complaint: abdominal pain     HPI:   27 yo  @8w6d by LMP on 22, presented to ED for lower abdominal pain x3w, cramping in nature, rated 8 out of 10 in intensity, comes and goes, improved with tylenol. Denies current vaginal bleeding or leakage of fluid. Had 1 prior episode of light bleeding a few weeks ago, however no bleeding since. Sees  for this pregnancy. Currently on vaginal progesterone. Reports nausea and vomiting for several weeks during pregnancy, however able to tolerate PO. Reports fasting for Ramadan for past 15 days, although fasting was discouraged during pregnancy by . Denies fever, chills, nausea, vomiting. Denies dysuria, hematuria, increased urinary frequency, or urgency. Denies chest pain, SOB, cough.     Ob/Gyn History:                   LMP - 22                   FT NSVDx1, no complications  Denies history of ovarian cysts, uterine fibroids, abnormal paps, or STIs    Denies the following: constitutional symptoms, visual symptoms, cardiovascular symptoms, respiratory symptoms, GI symptoms, musculoskeletal symptoms, skin symptoms, neurologic symptoms, hematologic symptoms, allergic symptoms, psychiatric symptoms  Except any pertinent positives listed.     Home Medications:  none    Allergies:  No Known Allergies      PAST MEDICAL & SURGICAL HISTORY:  none    FAMILY HISTORY:  Diabetes Mellitus (mother, father)    SOCIAL HISTORY: Denies cigarette use, alcohol use, or illicit drug use    Vital Signs Last 24 Hrs  T(F): 97.4 (2022 21:26), Max: 97.4 (2022 21:26)  HR: 105 (2022 21:26) (105 - 105)  BP: 132/61 (2022 21:26) (132/61 - 132/61)  RR: 20 (2022 21:26) (20 - 20)    General Appearance - AAOx3, NAD  Heart - S1S2 regular rate and rhythm  Lung - CTA Bilaterally  Abdomen - Soft, mild lower abdominal tenderness bilaterally, nondistended, no rebound, no rigidity, no guarding, bowel sounds present    GYN/Pelvis:  External genitalia: normal, no lesions  Vagina: no bleeding, no abnormal discharge  Cervix: closed, negative CMT  Uterus: no fundal tenderness  Adnexa: no adnexal fullness or tenderness palpated     LABS:                        10.5   11.23 )-----------( 258      ( 2022 21:44 )             32.0     HCG Quantitative, Serum: 976849.0 mIU/mL (22 @ 21:44)          136  |  103  |  10  ----------------------------<  110<H>  3.7   |  21  |  0.5<L>    Ca    9.3      2022 21:44    TPro  7.0  /  Alb  4.2  /  TBili  0.3  /  DBili  x   /  AST  23  /  ALT  46<H>  /  AlkPhos  115        Urinalysis Basic - ( 2022 21:44 )    Color: Yellow / Appearance: Slightly Turbid / S.032 / pH: x  Gluc: x / Ketone: Trace  / Bili: Negative / Urobili: 3 mg/dL   Blood: x / Protein: 30 mg/dL / Nitrite: Negative   Leuk Esterase: Small / RBC: 6 /HPF / WBC 12 /HPF   Sq Epi: x / Non Sq Epi: 7 /HPF / Bacteria: Negative          RADIOLOGY & ADDITIONAL STUDIES:    r< from: US Transvaginal (22 @ 00:07) >    ACC: [id number] EXAM:  US TRANSVAGINAL                          PROCEDURE DATE:  2022          INTERPRETATION:  CLINICAL HISTORY / REASON FOR EXAM: Abdominal pain..    TECHNIQUE: Ultrasound of the pelvis was performed with transvaginal   approach, including Doppler.    LMP: 2022.    FINDINGS:    The uterus measures 9.7 x 5.7 x 6.0 cm containing a single intrauterine   pregnancy with crown-rump length  measuring 1.98 cm corresponding to a   gestational age of 8 weeks and 4 days.  Fetal heart rate is 175   beats/min. A yolk sac is visualized. Small subchorionic fluid collection   likely subchorionic hematoma measuring approximately 1.6 x 0.8 x 2.2 cm    There is no adnexal mass or free pelvic fluid.    The right ovary measures 4.2 x 3.1 x 3.1 cm with a 2.4 x 2.6 x 2.4 cm   cyst demonstrating internal echoes likely hemorrhagic cyst. The left   ovary measures 2.9 x 1.2 x 2.3 cm.    Doppler flow is demonstrated to both ovaries.    IMPRESSION:    Single live intrauterine pregnancy corresponding to a gestational age of   8 weeks and 4 days.    Fetal heart rate at 175 beats/min.    Small subchorionic fluid collection likely subchorionic hematoma   measuring approximately 1.6 x 0.8 x 2.2 cm    2.4 x 2.6 x 2.4 cm right ovarian cyst demonstrating internal lacy echoes   likely hemorrhagic cyst.    --- End of Report ---            LAILA DAS MD; Attending Radiologist  This document has been electronically signed. 2022  1:02AM    < end of copied text >

## 2022-04-17 NOTE — CONSULT NOTE ADULT - ASSESSMENT
25 yo   @8w6d by LMP on 2/14/22    INCOMPLETE NOTE PENDING DISCUSSION WITH ATTENDING 25 yo  @8w6d by LMP on 22, with lower abdominal cramping improved with tylenol, with SIUP measuring 8w4d with FHR detected on ultrasound with subchorionic hematoma, clinically and hemodynamically stable    - no acute GYN intervention at this time  - Tylenol prn pain  - Discussed need for PO hydration and counseled patient on lifestyle modifications for nausea/vomiting in pregnancy including small bland meals throughout day, stella.   - Pain and bleeding precautions discussed.   - F/u with  outpatient  - F/u T&S in ED, if Rh positive, to receive Rhogam in ED  INCOMPLETE NOTE PENDING DISCUSSION WITH ATTENDING  27 yo  @8w6d by LMP on 22, with lower abdominal cramping improved with tylenol, with SIUP measuring 8w4d with FHR detected on ultrasound with subchorionic hematoma, clinically and hemodynamically stable    - no acute GYN intervention at this time  - Tylenol prn pain  - Discussed need for PO hydration and counseled patient on lifestyle modifications for nausea/vomiting in pregnancy including small bland meals throughout day, stella.   - Pain and bleeding precautions discussed.   - F/u with  outpatient  - F/u T&S in ED, if Rh positive, to receive Rhogam in ED  - Discussed with  and    - Nacho per ED  25 yo  @8w6d by LMP on 22, with lower abdominal cramping improved with tylenol, with SIUP measuring 8w4d with FHR detected on ultrasound with subchorionic hematoma, clinically and hemodynamically stable    - no acute GYN intervention at this time  - Tylenol prn pain  - Discussed need for PO hydration and counseled patient on lifestyle modifications for nausea/vomiting in pregnancy including small bland meals throughout day, stella.   - Pain and bleeding precautions discussed.   - F/u with  outpatient  - F/u T&S in ED, if Rh negative, to receive Rhogam in ED  - Discussed with  and    - Nacho per ED

## 2022-04-18 LAB
CULTURE RESULTS: SIGNIFICANT CHANGE UP
SPECIMEN SOURCE: SIGNIFICANT CHANGE UP

## 2022-08-05 ENCOUNTER — NON-APPOINTMENT (OUTPATIENT)
Age: 27
End: 2022-08-05

## 2022-10-18 ENCOUNTER — OUTPATIENT (OUTPATIENT)
Dept: EMERGENCY DEPT | Facility: HOSPITAL | Age: 27
LOS: 1 days | Discharge: HOME | End: 2022-10-18

## 2022-10-18 VITALS
WEIGHT: 186.07 LBS | HEIGHT: 65 IN | SYSTOLIC BLOOD PRESSURE: 119 MMHG | DIASTOLIC BLOOD PRESSURE: 73 MMHG | RESPIRATION RATE: 20 BRPM | TEMPERATURE: 98 F | HEART RATE: 109 BPM | OXYGEN SATURATION: 99 %

## 2022-10-18 VITALS — DIASTOLIC BLOOD PRESSURE: 58 MMHG | SYSTOLIC BLOOD PRESSURE: 104 MMHG | HEART RATE: 78 BPM

## 2022-10-18 DIAGNOSIS — O26.893 OTHER SPECIFIED PREGNANCY RELATED CONDITIONS, THIRD TRIMESTER: ICD-10-CM

## 2022-10-18 LAB
ALBUMIN SERPL ELPH-MCNC: 3.5 G/DL — SIGNIFICANT CHANGE UP (ref 3.5–5.2)
ALP SERPL-CCNC: 296 U/L — HIGH (ref 30–115)
ALT FLD-CCNC: 33 U/L — SIGNIFICANT CHANGE UP (ref 0–41)
ANION GAP SERPL CALC-SCNC: 12 MMOL/L — SIGNIFICANT CHANGE UP (ref 7–14)
AST SERPL-CCNC: 27 U/L — SIGNIFICANT CHANGE UP (ref 0–41)
BILIRUB SERPL-MCNC: 0.3 MG/DL — SIGNIFICANT CHANGE UP (ref 0.2–1.2)
BUN SERPL-MCNC: 6 MG/DL — LOW (ref 10–20)
CALCIUM SERPL-MCNC: 9 MG/DL — SIGNIFICANT CHANGE UP (ref 8.4–10.4)
CHLORIDE SERPL-SCNC: 104 MMOL/L — SIGNIFICANT CHANGE UP (ref 98–110)
CO2 SERPL-SCNC: 19 MMOL/L — SIGNIFICANT CHANGE UP (ref 17–32)
CREAT SERPL-MCNC: <0.5 MG/DL — LOW (ref 0.7–1.5)
EGFR: 141 ML/MIN/1.73M2 — SIGNIFICANT CHANGE UP
GLUCOSE BLDC GLUCOMTR-MCNC: 79 MG/DL — SIGNIFICANT CHANGE UP (ref 70–99)
GLUCOSE SERPL-MCNC: 77 MG/DL — SIGNIFICANT CHANGE UP (ref 70–99)
HCT VFR BLD CALC: 27.9 % — LOW (ref 37–47)
HGB BLD-MCNC: 8.6 G/DL — LOW (ref 12–16)
MCHC RBC-ENTMCNC: 21.9 PG — LOW (ref 27–31)
MCHC RBC-ENTMCNC: 30.8 G/DL — LOW (ref 32–37)
MCV RBC AUTO: 71.2 FL — LOW (ref 81–99)
NRBC # BLD: 0 /100 WBCS — SIGNIFICANT CHANGE UP (ref 0–0)
PLATELET # BLD AUTO: 349 K/UL — SIGNIFICANT CHANGE UP (ref 130–400)
POTASSIUM SERPL-MCNC: 5 MMOL/L — SIGNIFICANT CHANGE UP (ref 3.5–5)
POTASSIUM SERPL-SCNC: 5 MMOL/L — SIGNIFICANT CHANGE UP (ref 3.5–5)
PROT SERPL-MCNC: 6.7 G/DL — SIGNIFICANT CHANGE UP (ref 6–8)
RBC # BLD: 3.92 M/UL — LOW (ref 4.2–5.4)
RBC # FLD: 15.8 % — HIGH (ref 11.5–14.5)
SARS-COV-2 RNA SPEC QL NAA+PROBE: SIGNIFICANT CHANGE UP
SODIUM SERPL-SCNC: 135 MMOL/L — SIGNIFICANT CHANGE UP (ref 135–146)
WBC # BLD: 13.89 K/UL — HIGH (ref 4.8–10.8)
WBC # FLD AUTO: 13.89 K/UL — HIGH (ref 4.8–10.8)

## 2022-10-18 PROCEDURE — 99285 EMERGENCY DEPT VISIT HI MDM: CPT

## 2022-10-18 RX ORDER — SODIUM CHLORIDE 9 MG/ML
1000 INJECTION, SOLUTION INTRAVENOUS ONCE
Refills: 0 | Status: COMPLETED | OUTPATIENT
Start: 2022-10-18 | End: 2022-10-18

## 2022-10-18 RX ORDER — METOCLOPRAMIDE HCL 10 MG
10 TABLET ORAL ONCE
Refills: 0 | Status: COMPLETED | OUTPATIENT
Start: 2022-10-18 | End: 2022-10-18

## 2022-10-18 RX ORDER — ACETAMINOPHEN 500 MG
650 TABLET ORAL ONCE
Refills: 0 | Status: COMPLETED | OUTPATIENT
Start: 2022-10-18 | End: 2022-10-18

## 2022-10-18 RX ADMIN — Medication 650 MILLIGRAM(S): at 16:59

## 2022-10-18 RX ADMIN — Medication 10 MILLIGRAM(S): at 20:12

## 2022-10-18 RX ADMIN — SODIUM CHLORIDE 1000 MILLILITER(S): 9 INJECTION, SOLUTION INTRAVENOUS at 17:37

## 2022-10-18 NOTE — OB PROVIDER TRIAGE NOTE - NSOBPROVIDERNOTE_OBGYN_ALL_OB_FT
26y GP @ weeks, GBS unknown  -Discharged Home  -Labor precautions reviewed  -PO Hydration encouraged  -Reviewed fetal kick counts  -Follow up with scheduled OB visit  -Patient verbalized understanding 25y/o  at 35w1d dated by LMP confirmed by first trimester sonogram with [facility] 2022, with migraine headache now improved, BPP , reassuring fetal and maternal status.    -s/p 10mg reglan, headache now improved  -Discharged Home  -Labor precautions reviewed  -PO Hydration encouraged  -Reviewed fetal kick counts  -call Dr. Arcos's office in the am to schedule f/u next week  -Patient verbalized understanding    Dr. Faria and Dr. Arcos aware

## 2022-10-18 NOTE — ED PROVIDER NOTE - CLINICAL SUMMARY MEDICAL DECISION MAKING FREE TEXT BOX
26 year old female presented today with headache. Patient is currently 34 weeks pregnant. Patient is neurologically intact and hemodynamically stable with BP < 140 systolic. Patient's case discussed with OBGYN who recommended to L&D for evaluation. Patient given Tylenol and started on IV fluids and transferred.

## 2022-10-18 NOTE — OB RN TRIAGE NOTE - NSDCBPNONINVDIASTOLIC_OBGYN_A_OB_NU
Health Maintenance Summary     Topic Due On Due Status Completed On Postpone Until Reason    Colorectal Cancer Screening - Colonoscopy Dec 19, 2018 Not Due Dec 19, 2013      Diabetes Eye Exam Nov 2, 2018 Not Due Nov 2, 2017      Glycohemoglobin A1C  (Diabetes Sugar)  May 16, 2017 Overdue Nov 16, 2016      GFR  (Kidney Function Test)  Nov 16, 2017 Overdue Nov 16, 2016      Diabetes Foot Exam  Aug 4, 2017 Postponed Aug 4, 2016 Mar 15, 2018 Patient Refused    IMMUNIZATION - DTaP/Tdap/Td May 19, 2021 Not Due May 19, 2011      Immunization-Influenza  Completed Oct 16, 2017      Hepatitis C Screening  Completed Aug 4, 2016            Patient is due for topics as listed above, he wishes to discuss with provider . Due for diabetic foot exam             58

## 2022-10-18 NOTE — ED ADULT NURSE NOTE - OBJECTIVE STATEMENT
Pt reports to ed for "very bad" headache since this am. pt reports feeling pressure between her eyes.

## 2022-10-18 NOTE — OB PROVIDER TRIAGE NOTE - HISTORY OF PRESENT ILLNESS
27y/o  at 35w1d dated by LMP confirmed by first trimester sonogram with [facility] 2022, presents for headache since this morning at 6am. 7/10 intensity improved to 5/10 intensity with tylenol in ED. headache is located on top of her head and radiating to the front. Describes it as "throbbing". Associated with photophobia. Denies nausea or vomiting. Denies fevers, chills, SOB, CP, cough, sore throat or myalgias. Denies sick contacts.  Denies ctx, vaginal bleeding or LOF. Feels good fetal movements. No complications this pregnancy. Denies hx of migraines. GBS unknown    Last Halls: last week  Last BM: yesterday  Last PO: am

## 2022-10-18 NOTE — ED PROVIDER NOTE - PHYSICAL EXAMINATION
VITAL SIGNS: I have reviewed nursing notes and confirm.  CONSTITUTIONAL: Non-toxic appearing pregnant female in NAD  SKIN: Warm dry, normal skin turgor  HEAD: NCAT; no pain or pulsation to temporal region  EYES: No conjunctival injection, scleral anicteric; no nystagmus noted  ENT: Moist mucous membranes, normal pharynx with no erythema or exudates  NECK: Supple; full ROM. Nontender. No cervical LAD  CARD: RRR, no murmurs, rubs or gallops  RESP: Clear to ausculation bilaterally.  No rales, rhonchi, or wheezing.  ABD: Pregnant. No CVA tenderness  EXT: Full ROM, no bony tenderness, minor bilateral pedal edema noted, no calf tenderness  NEURO: Normal motor, normal sensory. Normal gait.  PSYCH: Cooperative, appropriate.

## 2022-10-18 NOTE — OB RN TRIAGE NOTE - FALL HARM RISK - UNIVERSAL INTERVENTIONS
Bed in lowest position, wheels locked, appropriate side rails in place/Call bell, personal items and telephone in reach/Instruct patient to call for assistance before getting out of bed or chair/Non-slip footwear when patient is out of bed/[place] to call system/Physically safe environment - no spills, clutter or unnecessary equipment/Purposeful Proactive Rounding/Room/bathroom lighting operational, light cord in reach

## 2022-10-18 NOTE — OB PROVIDER TRIAGE NOTE - ADDITIONAL INSTRUCTIONS
Please follow-up with your provider at your next scheduled prenatal appointment. Please call your physician if you experience any of the following:   -vaginal bleeding  -decreased fetal movement  -leakage of fluid  -contractions

## 2022-10-18 NOTE — OB PROVIDER TRIAGE NOTE - NSHPPHYSICALEXAM_GEN_ALL_CORE
Vital Signs Last 24 Hrs  T(C): 36.7 (18 Oct 2022 18:36), Max: 36.8 (18 Oct 2022 15:46)  T(F): 98 (18 Oct 2022 18:36), Max: 98.3 (18 Oct 2022 15:46)  HR: 79 (18 Oct 2022 19:03) (78 - 109)  BP: 115/59 (18 Oct 2022 19:03) (115/58 - 119/73)  RR: 20 (18 Oct 2022 18:36) (20 - 20)  SpO2: 99% (18 Oct 2022 15:46) (99% - 99%)    Gen: AOx3, no acute distress  Heart: s1, s2 no murmurs rubs or gallops  Lungs: CTAB  Abd: soft, gravid, non tender, no palpable contractions, no CVA tenderness  Ext: No edema bilaterally  Neuro: CNI-CNXII intact, strength 5+ UE    EFM:  140/mod/+accels  [place]:  none  SVE: deferred

## 2022-10-18 NOTE — ED PROVIDER NOTE - OBJECTIVE STATEMENT
Patient is a 26 year old  34 week pregnant female presenting for headache. Patient relates a sudden onset of band-like pressure that has been progressively getting worse since this morning. Patient endorses mild photophobia and sensitivity to noise but otherwise denies any chest pain, blurry vision, nausea, vomiting, shortness of breath, recent trauma or pain anywhere else. Expected due date is  and patient is compliant with prenatal care.

## 2022-10-18 NOTE — ED PROVIDER NOTE - NS ED ROS FT
Constitutional:  No fever, chills, lethargy, or abnormal weight loss  Eyes:  No eye pain; photophobia present  ENMT: No nasal discharge, no sore throat. Neck pain present  Cardiac:  No chest pain or palpitations  Respiratory:  No cough or respiratory distress  GI:  No nausea, vomiting, diarrhea or abdominal pain  :  No dysuria, frequency, or hematuria  MS:  No back or joint pain  Neuro:  Headache present. No numbness, weakness, or tingling  Skin:  No skin rash or pruritus.   Except as documented in the HPI,  all other systems are negative

## 2022-12-11 ENCOUNTER — NON-APPOINTMENT (OUTPATIENT)
Age: 27
End: 2022-12-11

## 2023-01-05 ENCOUNTER — EMERGENCY (EMERGENCY)
Facility: HOSPITAL | Age: 28
LOS: 0 days | Discharge: HOME | End: 2023-01-05
Attending: EMERGENCY MEDICINE | Admitting: EMERGENCY MEDICINE
Payer: MEDICAID

## 2023-01-05 VITALS
HEART RATE: 64 BPM | SYSTOLIC BLOOD PRESSURE: 109 MMHG | HEIGHT: 65 IN | DIASTOLIC BLOOD PRESSURE: 73 MMHG | TEMPERATURE: 97 F | WEIGHT: 177.91 LBS | RESPIRATION RATE: 18 BRPM | OXYGEN SATURATION: 100 %

## 2023-01-05 DIAGNOSIS — G89.29 OTHER CHRONIC PAIN: ICD-10-CM

## 2023-01-05 DIAGNOSIS — K08.89 OTHER SPECIFIED DISORDERS OF TEETH AND SUPPORTING STRUCTURES: ICD-10-CM

## 2023-01-05 PROBLEM — Z78.9 OTHER SPECIFIED HEALTH STATUS: Chronic | Status: ACTIVE | Noted: 2022-10-18

## 2023-01-05 PROCEDURE — 99283 EMERGENCY DEPT VISIT LOW MDM: CPT

## 2023-01-05 NOTE — CONSULT NOTE ADULT - SUBJECTIVE AND OBJECTIVE BOX
Patient is a 27y old  Female who presents with a chief complaint of pain maxillary right posterior    HPI: Patient reports chronic pain maxillary right posterior       PAST MEDICAL & SURGICAL HISTORY:  No pertinent past medical history      No significant past surgical history        ( - ) heart valve replacement  ( - ) joint replacement  ( - ) pregnancy    MEDICATIONS  (STANDING):    MEDICATIONS  (PRN):      Allergies    No Known Allergies    Intolerances        FAMILY HISTORY:  No pertinent family history in first degree relatives        *SOCIAL HISTORY: ( -  ) Tobacco; ( -  ) ETOH    *Last Dental Visit:    Vital Signs Last 24 Hrs  T(C): 36.1 (05 Jan 2023 13:38), Max: 36.1 (05 Jan 2023 13:38)  T(F): 97 (05 Jan 2023 13:38), Max: 97 (05 Jan 2023 13:38)  HR: 64 (05 Jan 2023 13:38) (64 - 64)  BP: 109/73 (05 Jan 2023 13:38) (109/73 - 109/73)  BP(mean): --  RR: 18 (05 Jan 2023 13:38) (18 - 18)  SpO2: 100% (05 Jan 2023 13:38) (100% - 100%)    Parameters below as of 05 Jan 2023 13:38  Patient On (Oxygen Delivery Method): room air        LABS:                  EOE:  TMJ ( - ) clicks                     ( - ) pops                     ( - ) crepitus             Mandible <<FROM>>             Facial bones and MOM <<grossly intact>>             ( - ) trismus             ( - ) lymphadenopathy             ( - ) swelling             ( - ) asymmetry             ( - ) palpation             ( - ) dyspnea             ( - ) dysphagia             ( - ) loss of consciousness    IOE:  <<permanent>> dentition: <<multiple carious teeth>>           hard/soft palate: <<No pathology noted>>           tongue/FOM <<No pathology noted>>           labial/buccal mucosa <<No pathology noted>>           ( + ) percussion           ( - ) palpation           ( - ) swelling            ( - ) abscess           ( - ) sinus tract    Dentition present: <<y>>  Mobility: <<n>>  Caries: << y>>         *DENTAL RADIOGRAPHS: Periapical #1    RADIOLOGY & ADDITIONAL STUDIES:    *ASSESSMENT: Nonrestorable tooth #1      *PLAN: Informed patient that she has a large carious lesion #1, no extraoral or intraoral abscess evident. Informed patient extraction would need to be performed with oral surgeon present. Informed patient of the process to have extraction done at [facility]. Patient understood and was dismissed.     Prescribed: Amoxicillin 500 mg q8h x 7days; Ibuprofen 600 mg 1q6h PRN:Pain    RECOMMENDATIONS:  1) Complete antiobiotic course as prescribed and analgesics as necessary for pain  2) Dental F/U with outpatient dentist for comprehensive dental care.   3) If any difficulty swallowing/breathing, fever occur, return to ER.     Resident Name:Michael Espinal, pager #[phone]

## 2023-01-05 NOTE — ED PROVIDER NOTE - NSFOLLOWUPINSTRUCTIONS_ED_ALL_ED_FT
Assessment/Plan:    No problem-specific Assessment & Plan notes found for this encounter  Diagnoses and all orders for this visit:    Atypical chest pain  Comments:  Resolved; pt stable on exam   Normal cardiac w/u earlier this year  Essential hypertension  Comments: Will add Amlodipine at this time to current management  Pt to continue a lower salt/carb diet, and try to get back to regular exercise  Will check BP at home  Orders:  -     amLODIPine (NORVASC) 5 mg tablet; Take 1 tablet (5 mg total) by mouth daily    Impaired fasting glucose  Comments:  A1c added to FBW - will do prior to next OV  Orders:  -     Comprehensive metabolic panel; Future  -     HEMOGLOBIN A1C W/ EAG ESTIMATION; Future    Hypertriglyceridemia  Comments:  Stable on present management  Orders:  -     Lipid Panel with Direct LDL reflex; Future    Screening for prostate cancer  Comments:  PSA incl with next FBW  Orders:  -     PSA, Total Screen; Future          Subjective:      Patient ID: Allison Castellon is a 62 y o  male  Cortez Flores presents today in f/u  Was seen in 05/2019 here for atypical type chest pain  He had had a reassuring stress echo test and Holter Monitor in 04/2019  Is wearing an oral applicator for YUAN at night; did not have sleep study          The following portions of the patient's history were reviewed and updated as appropriate: allergies, current medications, past family history, past social history, past surgical history and problem list     Past Medical History:   Diagnosis Date    Anxiety     Hypertension        Current Outpatient Medications:     amLODIPine (NORVASC) 5 mg tablet, Take 1 tablet (5 mg total) by mouth daily, Disp: 30 tablet, Rfl: 5    citalopram (CeleXA) 40 mg tablet, Take 1 tablet (40 mg total) by mouth daily for 90 days, Disp: 90 tablet, Rfl: 3    fenofibrate micronized (LOFIBRA) 134 MG capsule, Take 1 capsule (134 mg total) by mouth daily with breakfast, Disp: 90 capsule, Rfl: 3   lisinopril-hydrochlorothiazide (PRINZIDE,ZESTORETIC) 20-25 MG per tablet, Take 1 tablet by mouth daily for 90 days, Disp: 90 tablet, Rfl: 3    No Known Allergies    Social History     Tobacco Use    Smoking status: Never Smoker    Smokeless tobacco: Never Used   Substance Use Topics    Alcohol use: Yes     Comment: 2 x monthly    Drug use: No       Review of Systems   Constitutional: Negative for activity change  HENT: Negative for sore throat  Had a recent ST on a trip; resolved  Respiratory: Negative for shortness of breath  Cardiovascular: Negative for chest pain and palpitations  Objective:      /90   Pulse 58   Temp (!) 96 8 °F (36 °C)   Resp 16   Ht 5' 8 74" (1 746 m)   Wt 82 7 kg (182 lb 6 4 oz)   SpO2 97%   BMI 27 14 kg/m²   Repeat BP = 152/82 LA seated  Physical Exam   Constitutional: He is oriented to person, place, and time  He appears well-developed and well-nourished  No distress  HENT:   Head: Normocephalic and atraumatic  Mouth/Throat: Oropharynx is clear and moist  No oropharyngeal exudate  Eyes: Conjunctivae are normal    Neck: Normal range of motion  Neck supple  No thyromegaly present  Cardiovascular: Normal rate, regular rhythm and normal heart sounds  Exam reveals no gallop and no friction rub  No murmur heard  Pulmonary/Chest: Effort normal and breath sounds normal  No stridor  No respiratory distress  He has no wheezes  He has no rales  Lymphadenopathy:     He has no cervical adenopathy  Neurological: He is alert and oriented to person, place, and time  Skin: He is not diaphoretic  Psychiatric: His behavior is normal  Judgment and thought content normal  His mood appears anxious  His speech is tangential    Nursing note and vitals reviewed  Dental Pain    Dental pain (toothache) may be caused by many things including tooth decay (cavities or caries), abscess or infection, or trauma. If you were prescribed an antibiotic medicine, finish all of it even if you start to feel better. Rinsing your mouth with salt water or applying ice to the painful area of your face may help with the pain. Follow up with a dentist is important in ensuring good oral health and preventing the worsening of dental disease.    SEEK IMMEDIATE MEDICAL CARE IF YOU HAVE ANY OF THE FOLLOWING SYMPTOMS: unable to open your mouth, trouble breathing or swallowing, fever, or swelling of the face, neck, or jaw.

## 2023-01-05 NOTE — ED ADULT NURSE NOTE - NSIMPLEMENTINTERV_GEN_ALL_ED
Implemented All Universal Safety Interventions:  [place] to call system. Call bell, personal items and telephone within reach. Instruct patient to call for assistance. Room bathroom lighting operational. Non-slip footwear when patient is off stretcher. Physically safe environment: no spills, clutter or unnecessary equipment. Stretcher in lowest position, wheels locked, appropriate side rails in place.

## 2023-01-05 NOTE — ED PROVIDER NOTE - NS ED ATTENDING STATEMENT MOD
This was a shared visit with the ANUSHKA. I reviewed and verified the documentation and independently performed the documented:

## 2023-01-05 NOTE — ED PROVIDER NOTE - CLINICAL SUMMARY MEDICAL DECISION MAKING FREE TEXT BOX
No distress.  VSS.  No drooling or airway compromise.  No signs of sepsis.  Internal transfer to dental for definitive care.

## 2023-01-05 NOTE — ED ADULT NURSE NOTE - CHIEF COMPLAINT
INTERVAL HPI/OVERNIGHT EVENTS:  Patient S&E at bedside. No o/n events, patient resting comfortably. No complaints at this time. Patient denies fever, chills, dizziness, weakness, CP, palpitations, SOB, cough, N/V/D/C, dysuria, changes in bowel movements, LE edema.    Vital Signs Last 24 Hrs  T(C): 36.4 (16 Apr 2019 07:22), Max: 36.7 (16 Apr 2019 00:50)  T(F): 97.6 (16 Apr 2019 07:22), Max: 98 (16 Apr 2019 00:50)  HR: 86 (16 Apr 2019 07:22) (86 - 90)  BP: 130/79 (16 Apr 2019 07:22) (116/69 - 132/70)  BP(mean): --  RR: 18 (16 Apr 2019 07:22) (18 - 18)  SpO2: 99% (16 Apr 2019 07:22) (96% - 99%)    PHYSICAL EXAM:  GENERAL: sitting in a chair comfortably  EYES: EOMI, conjunctiva and sclera clear  NECK: supple  RESP: CTAB  HEART: RRR, S1/S2  ABDOMEN: +BS, soft, NT, ND  EXTREMITIES: no LE edema  NEUROLOGIC: no focal deficits, AAO x 3  SKIN: jaundice improving    MEDICATIONS  (STANDING):  acetaminophen  IVPB .. 1000 milliGRAM(s) IV Intermittent once  alfuzosin 10 milliGRAM(s) Oral at bedtime  atovaquone Suspension 1500 milliGRAM(s) Oral daily  dexamethasone  IVPB 20 milliGRAM(s) IV Intermittent daily  dexamethasone  IVPB 20 milliGRAM(s) IV Intermittent once  dextrose 5%. 1000 milliLiter(s) (50 mL/Hr) IV Continuous <Continuous>  dextrose 50% Injectable 12.5 Gram(s) IV Push once  dextrose 50% Injectable 25 Gram(s) IV Push once  dextrose 50% Injectable 25 Gram(s) IV Push once  finasteride 5 milliGRAM(s) Oral daily  insulin lispro (HumaLOG) corrective regimen sliding scale   SubCutaneous three times a day before meals  insulin lispro (HumaLOG) corrective regimen sliding scale   SubCutaneous at bedtime  pantoprazole  Injectable 40 milliGRAM(s) IV Push daily  ursodiol Tablet 500 milliGRAM(s) Oral two times a day    MEDICATIONS  (PRN):  dextrose 40% Gel 15 Gram(s) Oral once PRN Blood Glucose LESS THAN 70 milliGRAM(s)/deciliter  glucagon  Injectable 1 milliGRAM(s) IntraMuscular once PRN Glucose LESS THAN 70 milligrams/deciliter      Allergies    No Known Allergies    Intolerances    LABS:                                   7.9    1.92  )-----------( 81       ( 16 Apr 2019 09:33 )             23.1   04-16    138  |  105  |  35<H>  ----------------------------<  96  3.9   |  22  |  1.33<H>    Ca    9.1      16 Apr 2019 07:01    TPro  4.6<L>  /  Alb  2.8<L>  /  TBili  4.8<H>  /  DBili  x   /  AST  36  /  ALT  78<H>  /  AlkPhos  337<H>  04-16  Ferritin, Serum in AM (04.16.19 @ 09:45)    Ferritin, Serum: 5561: Test Repeated ng/mL    RADIOLOGY & ADDITIONAL TESTS:  Studies reviewed. The patient is a 27y Female complaining of dental pain/injury.

## 2023-01-05 NOTE — ED PROVIDER NOTE - PHYSICAL EXAMINATION
CONST: Well appearing in NAD  EYES: PERRL, EOMI, Sclera and conjunctiva clear.   ENTright upper posterior molar TTP. No swelling  NECK: Non-tender, no meningeal signs  SKIN: Warm, dry, no acute rashes. Good turgor  NEURO: A&Ox3, No focal deficits. Strength 5/5 with no sensory deficits. Steady gait

## 2023-01-18 NOTE — ED PROVIDER NOTE - WET READ LAUNCH FT
Additional Notes: Patient consent was obtained to proceed with the visit and recommended plan of care after discussion of all risks and benefits, including the risks of COVID-19 exposure Render Risk Assessment In Note?: no Detail Level: Simple There are no Wet Read(s) to document.

## 2023-02-16 ENCOUNTER — OUTPATIENT (OUTPATIENT)
Dept: OUTPATIENT SERVICES | Facility: HOSPITAL | Age: 28
LOS: 1 days | End: 2023-02-16
Payer: MEDICAID

## 2023-02-16 DIAGNOSIS — I82.621 ACUTE EMBOLISM AND THROMBOSIS OF DEEP VEINS OF RIGHT UPPER EXTREMITY: ICD-10-CM

## 2023-02-16 PROCEDURE — 93970 EXTREMITY STUDY: CPT

## 2023-02-16 PROCEDURE — 93970 EXTREMITY STUDY: CPT | Mod: 26

## 2023-02-17 DIAGNOSIS — I82.621 ACUTE EMBOLISM AND THROMBOSIS OF DEEP VEINS OF RIGHT UPPER EXTREMITY: ICD-10-CM

## 2023-07-15 ENCOUNTER — NON-APPOINTMENT (OUTPATIENT)
Age: 28
End: 2023-07-15

## 2023-12-01 ENCOUNTER — APPOINTMENT (OUTPATIENT)
Dept: GASTROENTEROLOGY | Facility: CLINIC | Age: 28
End: 2023-12-01
Payer: MEDICAID

## 2023-12-01 VITALS — HEIGHT: 65 IN | BODY MASS INDEX: 27.49 KG/M2 | WEIGHT: 165 LBS

## 2023-12-01 DIAGNOSIS — Z78.9 OTHER SPECIFIED HEALTH STATUS: ICD-10-CM

## 2023-12-01 DIAGNOSIS — R10.10 UPPER ABDOMINAL PAIN, UNSPECIFIED: ICD-10-CM

## 2023-12-01 PROBLEM — Z00.00 ENCOUNTER FOR PREVENTIVE HEALTH EXAMINATION: Status: ACTIVE | Noted: 2023-12-01

## 2023-12-01 PROCEDURE — 99204 OFFICE O/P NEW MOD 45 MIN: CPT

## 2023-12-01 RX ORDER — SODIUM SULFATE, MAGNESIUM SULFATE, AND POTASSIUM CHLORIDE 17.75; 2.7; 2.25 G/1; G/1; G/1
1479-225-188 TABLET ORAL AS DIRECTED
Qty: 24 | Refills: 0 | Status: ACTIVE | COMMUNITY
Start: 2023-12-01 | End: 1900-01-01

## 2023-12-05 ENCOUNTER — TRANSCRIPTION ENCOUNTER (OUTPATIENT)
Age: 28
End: 2023-12-05

## 2023-12-05 ENCOUNTER — RESULT REVIEW (OUTPATIENT)
Age: 28
End: 2023-12-05

## 2023-12-05 ENCOUNTER — OUTPATIENT (OUTPATIENT)
Dept: OUTPATIENT SERVICES | Facility: HOSPITAL | Age: 28
LOS: 1 days | Discharge: HOME | End: 2023-12-05
Payer: MEDICAID

## 2023-12-05 VITALS
DIASTOLIC BLOOD PRESSURE: 57 MMHG | SYSTOLIC BLOOD PRESSURE: 107 MMHG | RESPIRATION RATE: 16 BRPM | HEART RATE: 83 BPM | OXYGEN SATURATION: 100 %

## 2023-12-05 VITALS
HEART RATE: 85 BPM | WEIGHT: 164.91 LBS | DIASTOLIC BLOOD PRESSURE: 66 MMHG | TEMPERATURE: 97 F | HEIGHT: 65 IN | SYSTOLIC BLOOD PRESSURE: 105 MMHG | RESPIRATION RATE: 18 BRPM

## 2023-12-05 DIAGNOSIS — R10.10 UPPER ABDOMINAL PAIN, UNSPECIFIED: ICD-10-CM

## 2023-12-05 DIAGNOSIS — D50.9 IRON DEFICIENCY ANEMIA, UNSPECIFIED: ICD-10-CM

## 2023-12-05 PROCEDURE — 88312 SPECIAL STAINS GROUP 1: CPT

## 2023-12-05 PROCEDURE — 88305 TISSUE EXAM BY PATHOLOGIST: CPT

## 2023-12-05 PROCEDURE — 88312 SPECIAL STAINS GROUP 1: CPT | Mod: 26

## 2023-12-05 PROCEDURE — 88305 TISSUE EXAM BY PATHOLOGIST: CPT | Mod: 26

## 2023-12-05 PROCEDURE — 45378 DIAGNOSTIC COLONOSCOPY: CPT

## 2023-12-05 PROCEDURE — 81025 URINE PREGNANCY TEST: CPT

## 2023-12-05 PROCEDURE — 43239 EGD BIOPSY SINGLE/MULTIPLE: CPT

## 2023-12-05 NOTE — ASU DISCHARGE PLAN (ADULT/PEDIATRIC) - NS MD DC FALL RISK RISK
For information on Fall & Injury Prevention, visit: [email or web address] OR  [email or web address] OR  https://www.cdc.gov/steadi/patient.html For information on Fall & Injury Prevention, visit: [email or web address] OR  [email or web address] OR  https://www.cdc.gov/steadi/patient.html

## 2023-12-05 NOTE — ASU DISCHARGE PLAN (ADULT/PEDIATRIC) - CARE PROVIDER_API CALL
Hope Bernard  Gastroenterology  [address] [place] [address]  Phone: [phone]  Fax: [phone]  Established Patient  Follow Up Time: Routine   Hope Bernard  Gastroenterology  [address] [place] [address]  Phone: [phone]  Fax: [phone]  Established Patient  Follow Up Time: Routine

## 2023-12-05 NOTE — H&P PST ADULT - HISTORY OF PRESENT ILLNESS
28yo female presents for evaluation of LUQ abdominal pain and iron deficiency anemia with constipation and occasional rectal bleeding. here for egd/colonoscopy

## 2023-12-05 NOTE — ASU PREOP CHECKLIST - NS PREOP CHK TRT ENDOSCOPY TIME
[address] [facility]   Phone: [phone]   Fax: [phone]      Physical Therapy Daily Treatment Note    Date: 5/10/2022  Patient Name: Brandi Dixon  : 1954   MRN: [id number]  DOInjury: 1 month  DOSx: NA   Referring Provider: Ragini Barrera PA-C  [address] Diagnosis:   M25.521, M25.522 (ICD-10-CM) - Pain of both elbows    Outcome Measure:  [place] 22.73%     S: See eval.   O: Pt given written HEP  Time 2153- 7138     Visit   Repeat outcome measure at mid point and end. Pain 2-8 /10     ROM Right Elbow:  AROM: 100° flexion, extension 0°  Right Wrist:   AROM: extension: 60°, flexion 60°, ulnar deviation: 15°, radial deviation: 12°     Left ELbow:  AROM: 120° flexion, extension 0°  Left Wrist:   AROM: extension: 75°, flexion 60°, ulnar deviation: 30°, radial deviation: 25°     Modalities            Manual                  Stretch      Wrist extensor stretch 1 x 20s holds B  HEP te   Wrist flexor stretch  1 x 20s holds B  HEP te         Towel IR stretch      IR reaching behind back      Exercise      Shrugs AROM      Pendulum Ex      UBE      Pulleys - flex      Pulleys-IR      Supine wand chest press      Supine wand flex      Supine wand ER/IR      Supine flexion      S-lying ABD      S-lying ER      Standing wand flex      Standing flexion      Standing ABD            ROWS: H Functional activities  To aid in ROM and strength needed for reaching , lifting ,pushing and pulling at home/work    ROWS: M  \"    ROWS: L  \"    ER  \"    IR  \"                A:  Tolerated well. Above added to written HEP to be performed 3 x 20s B two times a day. Also instructed in ice or ice massage 10 minutes as well as educated on elbow tendonitis brace. Pt given therapy number to call with any questions.       P: Continue with rehab plan  Alireza Castillo PT DPT, PT [id number]    Treatment Charges: Mins Units   Initial Evaluation 31 1 Re-Evaluation     Ther Exercise         TE 2    Manual Therapy     MT     Ther Activities        TA     Gait Training          GT     Neuro Re-education NR     Modalities     Non-Billable Service Time     Other     Total Time/Units 33 1 05-Dec-2023 08:37

## 2023-12-05 NOTE — ASU DISCHARGE PLAN (ADULT/PEDIATRIC) - PROVIDER TOKENS
PROVIDER:[TOKEN:[[id number]],FOLLOWUP:[Routine],ESTABLISHEDPATIENT:[T]] PROVIDER:[TOKEN:[[id number]],FOLLOWUP:[Routine],ESTABLISHEDPATIENT:[T]]

## 2023-12-05 NOTE — ASU PATIENT PROFILE, ADULT - FALL HARM RISK - UNIVERSAL INTERVENTIONS
Bed in lowest position, wheels locked, appropriate side rails in place/Call bell, personal items and telephone in reach/Instruct patient to call for assistance before getting out of bed or chair/Non-slip footwear when patient is out of bed/[place] to call system/Physically safe environment - no spills, clutter or unnecessary equipment/Purposeful Proactive Rounding/Room/bathroom lighting operational, light cord in reach Bed in lowest position, wheels locked, appropriate side rails in place/Call bell, personal items and telephone in reach/Instruct patient to call for assistance before getting out of bed or chair/Non-slip footwear when patient is out of bed/[place] to call system/Physically safe environment - no spills, clutter or unnecessary equipment/Purposeful Proactive Rounding/Room/bathroom lighting operational, light cord in reach

## 2023-12-05 NOTE — ASU PATIENT PROFILE, ADULT - PAIN SCALE PREFERRED, PROFILE
Viral syndrome   Many illnesses are caused by viruses. These conditions usually run their course in 7-14 days but may take up to 21 days to resolve. Antibiotics do not help fight viral infections and are not needed at this time. Viral syndromes are treated with symptomatic support. You may take tylenol or ibuprofen for fever or aches and pains. Stay hydrated by taking sips of water or non caffeinated, noncarbonated, and nonalcoholic beverages throughout the day. Call if you have a fever greater than 102 F or if symptoms improve and then get worse again. numerical 0-10

## 2023-12-06 LAB
SURGICAL PATHOLOGY STUDY: SIGNIFICANT CHANGE UP
SURGICAL PATHOLOGY STUDY: SIGNIFICANT CHANGE UP

## 2023-12-08 DIAGNOSIS — D50.9 IRON DEFICIENCY ANEMIA, UNSPECIFIED: ICD-10-CM

## 2023-12-08 DIAGNOSIS — B96.81 HELICOBACTER PYLORI [H. PYLORI] AS THE CAUSE OF DISEASES CLASSIFIED ELSEWHERE: ICD-10-CM

## 2023-12-08 DIAGNOSIS — K64.4 RESIDUAL HEMORRHOIDAL SKIN TAGS: ICD-10-CM

## 2023-12-08 DIAGNOSIS — K64.8 OTHER HEMORRHOIDS: ICD-10-CM

## 2023-12-08 DIAGNOSIS — K62.5 HEMORRHAGE OF ANUS AND RECTUM: ICD-10-CM

## 2023-12-08 DIAGNOSIS — K29.50 UNSPECIFIED CHRONIC GASTRITIS WITHOUT BLEEDING: ICD-10-CM

## 2023-12-08 DIAGNOSIS — K59.00 CONSTIPATION, UNSPECIFIED: ICD-10-CM

## 2024-01-22 ENCOUNTER — APPOINTMENT (OUTPATIENT)
Dept: GASTROENTEROLOGY | Facility: CLINIC | Age: 29
End: 2024-01-22
Payer: MEDICAID

## 2024-01-22 PROCEDURE — 99213 OFFICE O/P EST LOW 20 MIN: CPT | Mod: 95

## 2024-01-22 RX ORDER — BISMUTH SUBSALICYLATE 262 MG
262 TABLET,CHEWABLE ORAL 4 TIMES DAILY
Qty: 56 | Refills: 0 | Status: ACTIVE | COMMUNITY
Start: 2023-12-11 | End: 1900-01-01

## 2024-01-22 RX ORDER — PANTOPRAZOLE 40 MG/1
40 TABLET, DELAYED RELEASE ORAL
Qty: 28 | Refills: 0 | Status: ACTIVE | COMMUNITY
Start: 2023-12-11 | End: 1900-01-01

## 2024-01-22 RX ORDER — METRONIDAZOLE 250 MG/1
250 TABLET ORAL EVERY 6 HOURS
Qty: 56 | Refills: 0 | Status: ACTIVE | COMMUNITY
Start: 2023-12-11 | End: 1900-01-01

## 2024-01-22 RX ORDER — DOXYCYCLINE HYCLATE 100 MG/1
100 CAPSULE ORAL TWICE DAILY
Qty: 28 | Refills: 0 | Status: ACTIVE | COMMUNITY
Start: 2023-12-11 | End: 1900-01-01

## 2024-01-22 NOTE — REVIEW OF SYSTEMS
[Feeling Tired] : feeling tired [As Noted in HPI] : as noted in HPI [Abdominal Pain] : abdominal pain [Constipation] : constipation [Negative] : Neurological [Fever] : no fever [Chills] : no chills [Recent Weight Loss (___ Lbs)] : no recent weight loss [Vomiting] : no vomiting [Diarrhea] : no diarrhea [Heartburn] : no heartburn [Melena (black stool)] : no melena [Swollen Glands] : no swollen glands

## 2024-01-22 NOTE — ASSESSMENT
[FreeTextEntry1] : 27yo female presents for follow up of abdominal pain and iron deficiency anemia s/p EGD and colonoscopy revealing H pylori gastritis, pt not yet complete treatment  #H pylori gastritis #LUQ pain #Constipation #Iron deficiency anemia -Recommend quadruple therapy for 14 days. Discussed again correct timing and administration.  -Encouraged adequate hydration and fiber intake -Avoid straining/constipation -Will readdress need for capsule endoscopy at follow up visit  Follow up 1 month Pt agreeable with plan

## 2024-01-22 NOTE — HISTORY OF PRESENT ILLNESS
[Home] : at home, [unfilled] , at the time of the visit. [Medical Office: ([facility])___] : at the medical office located in  [Verbal consent obtained from patient] : the patient, [unfilled] [FreeTextEntry4] : REX BACON [de-identified] : 12/5/23 [FreeTextEntry1] : 12/5/23

## 2024-01-22 NOTE — REASON FOR VISIT
Patient returns today for a repeat urine culture is a test of cure because she had a positive urine culture on November 15.  Patient is asymptomatic      Electronically signed by:YUSEF BLOUNT M.D.  Nov 29 2018  9:48AM CST     [Follow-up] : a follow-up of an existing diagnosis [FreeTextEntry1] : RPA POST PROCEDURE

## 2024-06-03 ENCOUNTER — APPOINTMENT (OUTPATIENT)
Dept: GASTROENTEROLOGY | Facility: CLINIC | Age: 29
End: 2024-06-03
Payer: MEDICAID

## 2024-06-03 DIAGNOSIS — A04.8 OTHER SPECIFIED BACTERIAL INTESTINAL INFECTIONS: ICD-10-CM

## 2024-06-03 DIAGNOSIS — D64.9 ANEMIA, UNSPECIFIED: ICD-10-CM

## 2024-06-03 DIAGNOSIS — D50.9 IRON DEFICIENCY ANEMIA, UNSPECIFIED: ICD-10-CM

## 2024-06-03 PROCEDURE — 99213 OFFICE O/P EST LOW 20 MIN: CPT

## 2024-06-03 NOTE — ASSESSMENT
[FreeTextEntry1] : 29yo female presents for follow up of abdominal pain, constipation and iron deficiency anemia s/p EGD and colonoscopy revealing H pylori gastritis s/p quadruple therapy  #H pylori gastritis #Constipation #Iron deficiency anemia -Recommend H pylori stool Ag to confirm eradication -Encouraged adequate hydration 6-8 glasses water daily, and fiber intake ~25g daily -Miralax daily to help regulate bowel pattern -Avoid straining/constipation -Update labs including cbc, iron studies, ferritin, tsh -Antireflux measures discussed including elevating hob and to avoid lying down for 2-3 hours after meals  -Discussed avoidance of dietary triggers including spicy foods, coffee, etoh, citrus, tomatoes, chocolate, mints -Continue NSAID avoidance -Will readdress need for capsule endoscopy at follow up based on labs  Follow up 1 month Pt agreeable with plan, advised to contact us if questions/concerns

## 2024-06-03 NOTE — HISTORY OF PRESENT ILLNESS
[Home] : at home, [unfilled] , at the time of the visit. [Medical Office: ([facility])___] : at the medical office located in  [Verbal consent obtained from patient] : the patient, [unfilled] [FreeTextEntry4] : REX BACON [de-identified] : 12/5/23 [FreeTextEntry1] : 12/5/23

## 2024-07-24 ENCOUNTER — APPOINTMENT (OUTPATIENT)
Dept: OTOLARYNGOLOGY | Facility: CLINIC | Age: 29
End: 2024-07-24
Payer: MEDICAID

## 2024-07-24 VITALS — HEIGHT: 65 IN | BODY MASS INDEX: 28.66 KG/M2 | WEIGHT: 172 LBS

## 2024-07-24 DIAGNOSIS — R43.8 OTHER DISTURBANCES OF SMELL AND TASTE: ICD-10-CM

## 2024-07-24 DIAGNOSIS — R42 DIZZINESS AND GIDDINESS: ICD-10-CM

## 2024-07-24 DIAGNOSIS — R09.81 NASAL CONGESTION: ICD-10-CM

## 2024-07-24 PROCEDURE — 31231 NASAL ENDOSCOPY DX: CPT

## 2024-07-24 PROCEDURE — 92550 TYMPANOMETRY & REFLEX THRESH: CPT | Mod: 52

## 2024-07-24 PROCEDURE — 92557 COMPREHENSIVE HEARING TEST: CPT

## 2024-07-24 PROCEDURE — 99203 OFFICE O/P NEW LOW 30 MIN: CPT | Mod: 25

## 2024-07-24 RX ORDER — FLUTICASONE PROPIONATE 50 UG/1
50 SPRAY, METERED NASAL
Qty: 1 | Refills: 3 | Status: ACTIVE | COMMUNITY
Start: 2024-07-24 | End: 1900-01-01

## 2024-07-24 NOTE — ASSESSMENT
[FreeTextEntry1] : I reviewed, interpreted, and discussed the Audiogram done today. Bilateral mild SNHL.

## 2024-07-24 NOTE — REASON FOR VISIT
[Initial Evaluation] : an initial evaluation for [FreeTextEntry2] : clogged ears , pressure in ear ,

## 2024-07-24 NOTE — PROCEDURE
[Anterior rhinoscopy insufficient to account for symptoms] : anterior rhinoscopy insufficient to account for symptoms [None] : none [Flexible Endoscope] : examined with the flexible endoscope [Congested] : congested [Anjel] : anjel [Normal] : the paranasal sinuses had no abnormalities

## 2024-07-24 NOTE — HISTORY OF PRESENT ILLNESS
[Ear Fullness] : ear fullness [Vertigo] : vertigo [Dizziness] : dizziness [Headache] : headache [Hearing Loss] : hearing loss [Lightheadedness] : lightheadedness [de-identified] : Patient presents today c/o clogged ears , pressure in ear.   Patient states she has some muffled hearing in both ears. Denies any otalgia or otorrhea. Has a cold 3 months ago.  Has been having sensation that the room is spinning. Has nausea at times. Also feels like sense of smell has diminished, nasal congestion. [Tinnitus] : no tinnitus [Anxiety] : no anxiety [Neurologic Symptoms] : no associated neurologic symptoms [Orthostatic Hypotension] : no orthostatic hypotension [Otalgia] : no otalgia [Otorrhea] : no otorrhea [Visual Changes] : no visual changes [Recurrent Otitis Media] : no recurrent otitis media [Meningitis] : no meningitis [Glomus Tumor] : no glomus tumor [Otitis Media with Effusion] : no otitis media with effusion [Stroke] : no stroke [Acoustic Neuroma] : no acoustic neuroma [Presbycusis] : no presbycusis [Prior Ear Surgery] : no prior ear surgery [Facial Nerve Paralysis] : no facial nerve paralysis [Congenital Ear Malformation] : no congenital ear malformation [Allergic Rhinitis] : no allergic rhinitis [Meniere Disease] : no Meniere disease [Diabetes] : no diabetes [Otosclerosis] : no otosclerosis [Multiple Sclerosis] : no multiple sclerosis [Perilymphatic Fistula] : no perilymphatic fistula [Cardiac Disease] : no cardiac disease [Hypertension] : no hypertension [Hypotension] : no hypotension [Ototoxic Med Exposure] : no ototoxic medication exposure [History of TM Perforation] : no history of a tympanic membrane perforation [Loud Noise Exposure] : no history of loud noise exposure [Hx of Radiation Therapy] : no history of radiation therapy [Birth Prematurity] : no birth prematurity [Smoking] : no smoking [Alcohol] : no consumption of alcohol [Narcotic/Benzodiazepine] : no use of narcotic/benzodiazepine [FreeTextEntry2] : muffled hearing.

## 2024-09-19 ENCOUNTER — INPATIENT (INPATIENT)
Facility: HOSPITAL | Age: 29
LOS: 2 days | Discharge: HOME | DRG: 566 | End: 2024-09-22
Attending: OBSTETRICS & GYNECOLOGY | Admitting: OBSTETRICS & GYNECOLOGY
Payer: MEDICAID

## 2024-09-19 VITALS
WEIGHT: 179.9 LBS | TEMPERATURE: 98 F | HEART RATE: 84 BPM | DIASTOLIC BLOOD PRESSURE: 80 MMHG | OXYGEN SATURATION: 100 % | RESPIRATION RATE: 20 BRPM | SYSTOLIC BLOOD PRESSURE: 119 MMHG

## 2024-09-19 PROCEDURE — 99285 EMERGENCY DEPT VISIT HI MDM: CPT

## 2024-09-19 RX ORDER — ONDANSETRON HCL/PF 4 MG/2 ML
4 VIAL (ML) INJECTION ONCE
Refills: 0 | Status: COMPLETED | OUTPATIENT
Start: 2024-09-19 | End: 2024-09-19

## 2024-09-19 RX ORDER — SODIUM CHLORIDE 0.9 % (FLUSH) 0.9 %
1000 SYRINGE (ML) INJECTION ONCE
Refills: 0 | Status: COMPLETED | OUTPATIENT
Start: 2024-09-19 | End: 2024-09-19

## 2024-09-19 RX ORDER — ACETAMINOPHEN 325 MG
650 TABLET ORAL ONCE
Refills: 0 | Status: COMPLETED | OUTPATIENT
Start: 2024-09-19 | End: 2024-09-19

## 2024-09-19 RX ADMIN — Medication 1000 MILLILITER(S): at 22:30

## 2024-09-20 DIAGNOSIS — O46.91 ANTEPARTUM HEMORRHAGE, UNSPECIFIED, FIRST TRIMESTER: ICD-10-CM

## 2024-09-20 LAB
ALBUMIN SERPL ELPH-MCNC: 4.1 G/DL — SIGNIFICANT CHANGE UP (ref 3.5–5.2)
ALP SERPL-CCNC: 87 U/L — SIGNIFICANT CHANGE UP (ref 30–115)
ALT FLD-CCNC: 28 U/L — SIGNIFICANT CHANGE UP (ref 0–41)
ANION GAP SERPL CALC-SCNC: 11 MMOL/L — SIGNIFICANT CHANGE UP (ref 7–14)
ANION GAP SERPL CALC-SCNC: 7 MMOL/L — SIGNIFICANT CHANGE UP (ref 7–14)
APPEARANCE UR: CLEAR — SIGNIFICANT CHANGE UP
AST SERPL-CCNC: 20 U/L — SIGNIFICANT CHANGE UP (ref 0–41)
BACTERIA # UR AUTO: ABNORMAL /HPF
BASE EXCESS BLDV CALC-SCNC: -4.8 MMOL/L — LOW (ref -2–3)
BASOPHILS # BLD AUTO: 0.02 K/UL — SIGNIFICANT CHANGE UP (ref 0–0.2)
BASOPHILS # BLD AUTO: 0.04 K/UL — SIGNIFICANT CHANGE UP (ref 0–0.2)
BASOPHILS # BLD AUTO: 0.04 K/UL — SIGNIFICANT CHANGE UP (ref 0–0.2)
BASOPHILS NFR BLD AUTO: 0.2 % — SIGNIFICANT CHANGE UP (ref 0–1)
BASOPHILS NFR BLD AUTO: 0.3 % — SIGNIFICANT CHANGE UP (ref 0–1)
BASOPHILS NFR BLD AUTO: 0.3 % — SIGNIFICANT CHANGE UP (ref 0–1)
BILIRUB SERPL-MCNC: 0.2 MG/DL — SIGNIFICANT CHANGE UP (ref 0.2–1.2)
BILIRUB UR-MCNC: NEGATIVE — SIGNIFICANT CHANGE UP
BLD GP AB SCN SERPL QL: SIGNIFICANT CHANGE UP
BUN SERPL-MCNC: 13 MG/DL — SIGNIFICANT CHANGE UP (ref 10–20)
BUN SERPL-MCNC: 9 MG/DL — LOW (ref 10–20)
CA-I SERPL-SCNC: 1.16 MMOL/L — SIGNIFICANT CHANGE UP (ref 1.15–1.33)
CALCIUM SERPL-MCNC: 8.3 MG/DL — LOW (ref 8.4–10.5)
CALCIUM SERPL-MCNC: 9.2 MG/DL — SIGNIFICANT CHANGE UP (ref 8.4–10.5)
CAST: 0 /LPF — SIGNIFICANT CHANGE UP (ref 0–4)
CHLORIDE SERPL-SCNC: 102 MMOL/L — SIGNIFICANT CHANGE UP (ref 98–110)
CHLORIDE SERPL-SCNC: 106 MMOL/L — SIGNIFICANT CHANGE UP (ref 98–110)
CO2 SERPL-SCNC: 20 MMOL/L — SIGNIFICANT CHANGE UP (ref 17–32)
CO2 SERPL-SCNC: 22 MMOL/L — SIGNIFICANT CHANGE UP (ref 17–32)
COLOR SPEC: YELLOW — SIGNIFICANT CHANGE UP
CREAT SERPL-MCNC: 0.5 MG/DL — LOW (ref 0.7–1.5)
CREAT SERPL-MCNC: <0.5 MG/DL — LOW (ref 0.7–1.5)
DIFF PNL FLD: NEGATIVE — SIGNIFICANT CHANGE UP
EGFR: 132 ML/MIN/1.73M2 — SIGNIFICANT CHANGE UP
EGFR: 139 ML/MIN/1.73M2 — SIGNIFICANT CHANGE UP
EOSINOPHIL # BLD AUTO: 0.1 K/UL — SIGNIFICANT CHANGE UP (ref 0–0.7)
EOSINOPHIL # BLD AUTO: 0.1 K/UL — SIGNIFICANT CHANGE UP (ref 0–0.7)
EOSINOPHIL # BLD AUTO: 0.15 K/UL — SIGNIFICANT CHANGE UP (ref 0–0.7)
EOSINOPHIL NFR BLD AUTO: 0.7 % — SIGNIFICANT CHANGE UP (ref 0–8)
EOSINOPHIL NFR BLD AUTO: 1 % — SIGNIFICANT CHANGE UP (ref 0–8)
EOSINOPHIL NFR BLD AUTO: 1.1 % — SIGNIFICANT CHANGE UP (ref 0–8)
GAS PNL BLDV: 129 MMOL/L — LOW (ref 136–145)
GAS PNL BLDV: SIGNIFICANT CHANGE UP
GAS PNL BLDV: SIGNIFICANT CHANGE UP
GLUCOSE SERPL-MCNC: 70 MG/DL — SIGNIFICANT CHANGE UP (ref 70–99)
GLUCOSE SERPL-MCNC: 88 MG/DL — SIGNIFICANT CHANGE UP (ref 70–99)
GLUCOSE UR QL: NEGATIVE MG/DL — SIGNIFICANT CHANGE UP
HCG SERPL-ACNC: HIGH MIU/ML
HCO3 BLDV-SCNC: 19 MMOL/L — LOW (ref 22–29)
HCT VFR BLD CALC: 30.9 % — LOW (ref 37–47)
HCT VFR BLD CALC: 33.1 % — LOW (ref 37–47)
HCT VFR BLD CALC: 34.7 % — LOW (ref 37–47)
HCT VFR BLDA CALC: 30 % — LOW (ref 34.5–46.5)
HGB BLD CALC-MCNC: 10.1 G/DL — LOW (ref 11.7–16.1)
HGB BLD-MCNC: 10.4 G/DL — LOW (ref 12–16)
HGB BLD-MCNC: 10.9 G/DL — LOW (ref 12–16)
HGB BLD-MCNC: 9.6 G/DL — LOW (ref 12–16)
IMM GRANULOCYTES NFR BLD AUTO: 0.3 % — SIGNIFICANT CHANGE UP (ref 0.1–0.3)
IMM GRANULOCYTES NFR BLD AUTO: 0.4 % — HIGH (ref 0.1–0.3)
IMM GRANULOCYTES NFR BLD AUTO: 0.5 % — HIGH (ref 0.1–0.3)
KETONES UR-MCNC: 15 MG/DL
LACTATE BLDV-MCNC: 2.6 MMOL/L — HIGH (ref 0.5–2)
LACTATE SERPL-SCNC: 1 MMOL/L — SIGNIFICANT CHANGE UP (ref 0.7–2)
LEUKOCYTE ESTERASE UR-ACNC: ABNORMAL
LYMPHOCYTES # BLD AUTO: 2.82 K/UL — SIGNIFICANT CHANGE UP (ref 1.2–3.4)
LYMPHOCYTES # BLD AUTO: 2.98 K/UL — SIGNIFICANT CHANGE UP (ref 1.2–3.4)
LYMPHOCYTES # BLD AUTO: 20.3 % — LOW (ref 20.5–51.1)
LYMPHOCYTES # BLD AUTO: 29.5 % — SIGNIFICANT CHANGE UP (ref 20.5–51.1)
LYMPHOCYTES # BLD AUTO: 30.2 % — SIGNIFICANT CHANGE UP (ref 20.5–51.1)
LYMPHOCYTES # BLD AUTO: 4.23 K/UL — HIGH (ref 1.2–3.4)
MCHC RBC-ENTMCNC: 24.1 PG — LOW (ref 27–31)
MCHC RBC-ENTMCNC: 24.3 PG — LOW (ref 27–31)
MCHC RBC-ENTMCNC: 24.5 PG — LOW (ref 27–31)
MCHC RBC-ENTMCNC: 31.1 G/DL — LOW (ref 32–37)
MCHC RBC-ENTMCNC: 31.4 G/DL — LOW (ref 32–37)
MCHC RBC-ENTMCNC: 31.4 G/DL — LOW (ref 32–37)
MCV RBC AUTO: 77.4 FL — LOW (ref 81–99)
MCV RBC AUTO: 77.5 FL — LOW (ref 81–99)
MCV RBC AUTO: 78.1 FL — LOW (ref 81–99)
MONOCYTES # BLD AUTO: 0.56 K/UL — SIGNIFICANT CHANGE UP (ref 0.1–0.6)
MONOCYTES # BLD AUTO: 0.85 K/UL — HIGH (ref 0.1–0.6)
MONOCYTES # BLD AUTO: 0.88 K/UL — HIGH (ref 0.1–0.6)
MONOCYTES NFR BLD AUTO: 5.8 % — SIGNIFICANT CHANGE UP (ref 1.7–9.3)
MONOCYTES NFR BLD AUTO: 6 % — SIGNIFICANT CHANGE UP (ref 1.7–9.3)
MONOCYTES NFR BLD AUTO: 6.1 % — SIGNIFICANT CHANGE UP (ref 1.7–9.3)
NEUTROPHILS # BLD AUTO: 10.63 K/UL — HIGH (ref 1.4–6.5)
NEUTROPHILS # BLD AUTO: 5.8 K/UL — SIGNIFICANT CHANGE UP (ref 1.4–6.5)
NEUTROPHILS # BLD AUTO: 8.99 K/UL — HIGH (ref 1.4–6.5)
NEUTROPHILS NFR BLD AUTO: 62.1 % — SIGNIFICANT CHANGE UP (ref 42.2–75.2)
NEUTROPHILS NFR BLD AUTO: 62.8 % — SIGNIFICANT CHANGE UP (ref 42.2–75.2)
NEUTROPHILS NFR BLD AUTO: 72.4 % — SIGNIFICANT CHANGE UP (ref 42.2–75.2)
NITRITE UR-MCNC: NEGATIVE — SIGNIFICANT CHANGE UP
NRBC # BLD: 0 /100 WBCS — SIGNIFICANT CHANGE UP (ref 0–0)
PCO2 BLDV: 29 MMHG — LOW (ref 39–42)
PH BLDV: 7.42 — SIGNIFICANT CHANGE UP (ref 7.32–7.43)
PH UR: 6 — SIGNIFICANT CHANGE UP (ref 5–8)
PHOSPHATE SERPL-MCNC: 3.1 MG/DL — SIGNIFICANT CHANGE UP (ref 2.1–4.9)
PLATELET # BLD AUTO: 240 K/UL — SIGNIFICANT CHANGE UP (ref 130–400)
PLATELET # BLD AUTO: 263 K/UL — SIGNIFICANT CHANGE UP (ref 130–400)
PLATELET # BLD AUTO: 291 K/UL — SIGNIFICANT CHANGE UP (ref 130–400)
PMV BLD: 11.6 FL — HIGH (ref 7.4–10.4)
PMV BLD: 11.6 FL — HIGH (ref 7.4–10.4)
PMV BLD: 11.7 FL — HIGH (ref 7.4–10.4)
PO2 BLDV: 42 MMHG — SIGNIFICANT CHANGE UP (ref 25–45)
POTASSIUM BLDV-SCNC: 4.2 MMOL/L — SIGNIFICANT CHANGE UP (ref 3.5–5.1)
POTASSIUM SERPL-MCNC: 3.9 MMOL/L — SIGNIFICANT CHANGE UP (ref 3.5–5)
POTASSIUM SERPL-MCNC: 4.2 MMOL/L — SIGNIFICANT CHANGE UP (ref 3.5–5)
POTASSIUM SERPL-SCNC: 3.9 MMOL/L — SIGNIFICANT CHANGE UP (ref 3.5–5)
POTASSIUM SERPL-SCNC: 4.2 MMOL/L — SIGNIFICANT CHANGE UP (ref 3.5–5)
PROT SERPL-MCNC: 7.3 G/DL — SIGNIFICANT CHANGE UP (ref 6–8)
PROT UR-MCNC: SIGNIFICANT CHANGE UP MG/DL
RBC # BLD: 3.99 M/UL — LOW (ref 4.2–5.4)
RBC # BLD: 4.24 M/UL — SIGNIFICANT CHANGE UP (ref 4.2–5.4)
RBC # BLD: 4.48 M/UL — SIGNIFICANT CHANGE UP (ref 4.2–5.4)
RBC # FLD: 15.9 % — HIGH (ref 11.5–14.5)
RBC CASTS # UR COMP ASSIST: 2 /HPF — SIGNIFICANT CHANGE UP (ref 0–4)
SAO2 % BLDV: 70.5 % — SIGNIFICANT CHANGE UP (ref 67–88)
SODIUM SERPL-SCNC: 133 MMOL/L — LOW (ref 135–146)
SODIUM SERPL-SCNC: 135 MMOL/L — SIGNIFICANT CHANGE UP (ref 135–146)
SP GR SPEC: 1.03 — SIGNIFICANT CHANGE UP (ref 1–1.03)
SQUAMOUS # UR AUTO: 3 /HPF — SIGNIFICANT CHANGE UP (ref 0–5)
UROBILINOGEN FLD QL: 1 MG/DL — SIGNIFICANT CHANGE UP (ref 0.2–1)
WBC # BLD: 14.34 K/UL — HIGH (ref 4.8–10.8)
WBC # BLD: 14.67 K/UL — HIGH (ref 4.8–10.8)
WBC # BLD: 9.34 K/UL — SIGNIFICANT CHANGE UP (ref 4.8–10.8)
WBC # FLD AUTO: 14.34 K/UL — HIGH (ref 4.8–10.8)
WBC # FLD AUTO: 14.67 K/UL — HIGH (ref 4.8–10.8)
WBC # FLD AUTO: 9.34 K/UL — SIGNIFICANT CHANGE UP (ref 4.8–10.8)
WBC UR QL: 26 /HPF — HIGH (ref 0–5)

## 2024-09-20 PROCEDURE — 86901 BLOOD TYPING SEROLOGIC RH(D): CPT

## 2024-09-20 PROCEDURE — 86900 BLOOD TYPING SEROLOGIC ABO: CPT

## 2024-09-20 PROCEDURE — 36415 COLL VENOUS BLD VENIPUNCTURE: CPT

## 2024-09-20 PROCEDURE — 86850 RBC ANTIBODY SCREEN: CPT

## 2024-09-20 PROCEDURE — 83605 ASSAY OF LACTIC ACID: CPT

## 2024-09-20 PROCEDURE — 80048 BASIC METABOLIC PNL TOTAL CA: CPT

## 2024-09-20 PROCEDURE — 84100 ASSAY OF PHOSPHORUS: CPT

## 2024-09-20 PROCEDURE — 85025 COMPLETE CBC W/AUTO DIFF WBC: CPT

## 2024-09-20 PROCEDURE — 87086 URINE CULTURE/COLONY COUNT: CPT

## 2024-09-20 PROCEDURE — 76775 US EXAM ABDO BACK WALL LIM: CPT | Mod: 26

## 2024-09-20 PROCEDURE — 76775 US EXAM ABDO BACK WALL LIM: CPT

## 2024-09-20 PROCEDURE — 76830 TRANSVAGINAL US NON-OB: CPT | Mod: 26

## 2024-09-20 RX ORDER — SODIUM CHLORIDE IRRIG SOLUTION 0.9 %
1000 SOLUTION, IRRIGATION IRRIGATION ONCE
Refills: 0 | Status: COMPLETED | OUTPATIENT
Start: 2024-09-20 | End: 2024-09-20

## 2024-09-20 RX ORDER — MORPHINE SULFATE 30 MG/1
2 TABLET, FILM COATED, EXTENDED RELEASE ORAL ONCE
Refills: 0 | Status: DISCONTINUED | OUTPATIENT
Start: 2024-09-20 | End: 2024-09-20

## 2024-09-20 RX ORDER — ONDANSETRON HCL/PF 4 MG/2 ML
4 VIAL (ML) INJECTION THREE TIMES A DAY
Refills: 0 | Status: DISCONTINUED | OUTPATIENT
Start: 2024-09-20 | End: 2024-09-22

## 2024-09-20 RX ORDER — SODIUM CHLORIDE IRRIG SOLUTION 0.9 %
1000 SOLUTION, IRRIGATION IRRIGATION
Refills: 0 | Status: DISCONTINUED | OUTPATIENT
Start: 2024-09-20 | End: 2024-09-22

## 2024-09-20 RX ORDER — CEFTRIAXONE SODIUM 1 G
1000 VIAL (EA) INJECTION ONCE
Refills: 0 | Status: COMPLETED | OUTPATIENT
Start: 2024-09-20 | End: 2024-09-20

## 2024-09-20 RX ORDER — ACETAMINOPHEN 325 MG
650 TABLET ORAL EVERY 6 HOURS
Refills: 0 | Status: DISCONTINUED | OUTPATIENT
Start: 2024-09-20 | End: 2024-09-22

## 2024-09-20 RX ADMIN — Medication 650 MILLIGRAM(S): at 15:59

## 2024-09-20 RX ADMIN — Medication 650 MILLIGRAM(S): at 00:05

## 2024-09-20 RX ADMIN — Medication 1000 MILLILITER(S): at 04:34

## 2024-09-20 RX ADMIN — Medication 150 MILLILITER(S): at 06:37

## 2024-09-20 RX ADMIN — Medication 100 MILLIGRAM(S): at 04:00

## 2024-09-20 RX ADMIN — Medication 2000 MILLILITER(S): at 04:10

## 2024-09-20 RX ADMIN — Medication 650 MILLIGRAM(S): at 14:59

## 2024-09-20 RX ADMIN — Medication 4 MILLIGRAM(S): at 06:41

## 2024-09-20 RX ADMIN — Medication 4 MILLIGRAM(S): at 00:05

## 2024-09-20 RX ADMIN — Medication 650 MILLIGRAM(S): at 06:37

## 2024-09-20 NOTE — ED PROVIDER NOTE - NS ED ROS FT
Constitutional: see HPI  Eyes:  No visual changes, eye pain or discharge.  ENMT:  No hearing changes, pain, discharge or infections. No neck pain or stiffness.  Cardiac:  No chest pain, SOB or edema. No chest pain with exertion.  Respiratory:  No cough or respiratory distress. No hemoptysis. No history of asthma or RAD.  GI:  see  HPI  :  No dysuria, frequency or burning.  MS:  No myalgia, muscle weakness, joint pain or back pain.  Neuro:  No headache or weakness.  No LOC.  Skin:  No skin rash.   Endocrine: No history of thyroid disease or diabetes.

## 2024-09-20 NOTE — ED PROVIDER NOTE - PRINCIPAL DIAGNOSIS
Abdominal pain in pregnancy UTI in pregnancy, first trimester Pyelonephritis during pregnancy in first trimester, antepartum

## 2024-09-20 NOTE — ED PROVIDER NOTE - NSFOLLOWUPINSTRUCTIONS_ED_ALL_ED_FT
Abdominal Pain During Pregnancy    Abdominal pain is common during pregnancy, and has many possible causes. Some causes are more serious than others, and sometimes the cause is not known. Abdominal pain can be a sign that labor is starting. It can also be caused by normal growth and stretching of muscles and ligaments during pregnancy. Always tell your health care provider if you have any abdominal pain.    Follow these instructions at home:  Do not have sex or put anything in your vagina until your pain goes away completely.  Get plenty of rest until your pain improves.  Drink enough fluid to keep your urine pale yellow.  Take over-the-counter and prescription medicines only as told by your health care provider.  Keep all follow-up visits as told by your health care provider. This is important.  Contact a health care provider if:  Your pain continues or gets worse after resting.  You have lower abdominal pain that:  Comes and goes at regular intervals.  Spreads to your back.  Is similar to menstrual cramps.  You have pain or burning when you urinate.  Get help right away if:  You have a fever or chills.  You have vaginal bleeding.  You are leaking fluid from your vagina.  You are passing tissue from your vagina.  You have vomiting or diarrhea that lasts for more than 24 hours.  Your baby is moving less than usual.  You feel very weak or faint.  You have shortness of breath.  You develop severe pain in your upper abdomen.  Summary  Abdominal pain is common during pregnancy, and has many possible causes.  If you experience abdominal pain during pregnancy, tell your health care provider right away.  Follow your health care provider's home care instructions and keep all follow-up visits as directed.

## 2024-09-20 NOTE — ED PROVIDER NOTE - PATIENT PORTAL LINK FT
You can access the FollowMyHealth Patient Portal offered by [facility] by registering at the following website: [email or web address]. By joining [facility]’s FollowMyHealth portal, you will also be able to view your health information using other applications (apps) compatible with our system.

## 2024-09-20 NOTE — H&P ADULT - HISTORY OF PRESENT ILLNESS
Chief Complaint: lower abdominal and back pain    HPI: 28y , @7w6d by LMP c/w first trimester sono, presenting to ED for lower abdominal pain, back pain and nausea.  Patient has been having nausea and vomiting for 10 days she believes due to pregnancy.  Yesterday during the day she started having lower abdominal pain and back pain, worse with urination.  While being in [facility] ED she started developing chills, subjective fevers, and feeling lightheaded and dizzy.  Denies diarrhea/constipation.  Denies abdominal cramping, vaginal discharge or vaginal bleeding.      Ob/Gyn History:    FT NSVDx2  Denies history of ovarian cysts, uterine fibroids, abnormal paps, or STIs

## 2024-09-20 NOTE — H&P ADULT - NSHPPHYSICALEXAM_GEN_ALL_CORE
Vital Signs Last 24 Hrs  T(C): 36.7 (20 Sep 2024 04:55), Max: 36.7 (20 Sep 2024 04:55)  T(F): 98 (20 Sep 2024 04:55), Max: 98 (20 Sep 2024 04:55)  HR: 86 (20 Sep 2024 04:55) (84 - 86)  BP: 133/79 (20 Sep 2024 04:55) (119/80 - 133/79)  BP(mean): --  RR: 20 (20 Sep 2024 04:55) (20 - 20)  SpO2: 100% (20 Sep 2024 04:55) (100% - 100%)    Parameters below as of 20 Sep 2024 04:55  Patient On (Oxygen Delivery Method): room air    Gen: AAOx3, shaking with rigors  Abd: soft, nondistended, tender to palpation suprapubic, LLQ   Back: + L CVA tenderness

## 2024-09-20 NOTE — ED PROVIDER NOTE - CLINICAL SUMMARY MEDICAL DECISION MAKING FREE TEXT BOX
pt 6 weeks pregnant presents for evaluation of lower abd pain, labs with elevated WBC and mild UTI. pt without acute findings on US> sx worsened in ED with progressive flank tenderness. Ceftriaxone started and pt evaluated by GYN and admitted to GYN service for UTI in pregnancy

## 2024-09-20 NOTE — ED PROVIDER NOTE - OBJECTIVE STATEMENT
28-year-old female G3, P2, presents for evaluation of diffuse abdominal cramping that started around 7 PM.  Patient denies any vaginal bleeding, dysuria, urinary frequency or flank pain.  Denies any nausea, vomiting, fevers, chills, cough or URI symptoms.  No trauma or falls. Following with Dr. Dale–GYN. 28 year old  F currenty 8 weeks pregnant present to er for eval of 1 day of diffuse lower abd cramping. + nausea and mild dysuria. No fever, chills, back or flank pain, weakness, chest pain, sob, recent illness, cough, uri symptoms.

## 2024-09-20 NOTE — ED PROVIDER NOTE - CARE PLAN
Principal Discharge DX:	Abdominal pain in pregnancy   1 Principal Discharge DX:	UTI in pregnancy, first trimester   Principal Discharge DX:	Pyelonephritis during pregnancy in first trimester, antepartum

## 2024-09-20 NOTE — ED PROVIDER NOTE - CARE PROVIDER_API CALL
Cintia Arcos  Obstetrics and Gynecology  [address] [place] [id number]  Phone: [phone]  Fax: [phone]  Follow Up Time:

## 2024-09-20 NOTE — ED PROVIDER NOTE - ATTENDING APP SHARED VISIT CONTRIBUTION OF CARE
28-year-old female G3, P2, presents for evaluation of diffuse abdominal cramping that started around 7 PM.  Patient denies any vaginal bleeding, dysuria, urinary frequency or flank pain.  Denies any nausea, vomiting, fevers, chills, cough or URI symptoms.  No trauma or falls. Following with Dr. Dale–GYN.    VITAL SIGNS: noted  CONSTITUTIONAL: Well-developed; well-nourished; in no acute distress  HEAD: Normocephalic; atraumatic  EYES: PERRL, EOM intact; conjunctiva and sclera clear  ENT: No nasal discharge; airway clear. MMM  NECK: Supple; non tender. No anterior cervical lymphadenopathy noted  CARD: S1, S2 normal; no murmurs, gallops, or rubs. Regular rate and rhythm  RESP: CTAB/L, no wheezes, rales or rhonchi  ABD: Normal bowel sounds; soft; non-distended; + mild suprapubic and lower abd tenderness, no rebound or guarding, no CVA tenderness  EXT: Normal ROM. No calf tenderness or edema. Distal pulses intact  NEURO: Alert, oriented. Grossly unremarkable. No focal deficits  SKIN: Skin exam is warm and dry

## 2024-09-20 NOTE — ED PROVIDER NOTE - PHYSICAL EXAMINATION
VITAL SIGNS: noted  CONSTITUTIONAL: Well-developed; well-nourished; in no acute distress  HEAD: Normocephalic; atraumatic  EYES: PERRL, EOM intact; conjunctiva and sclera clear  ENT: No nasal discharge; airway clear. MMM  NECK: Supple; non tender. No anterior cervical lymphadenopathy noted  CARD: S1, S2 normal; no murmurs, gallops, or rubs. Regular rate and rhythm  RESP: CTAB/L, no wheezes, rales or rhonchi  ABD: Normal bowel sounds; soft; non-distended; + mild suprapubic and lower abd tenderness, no rebound or guarding, no CVA tenderness  EXT: Normal ROM. No calf tenderness or edema. Distal pulses intact  NEURO: Alert, oriented. Grossly unremarkable. No focal deficits  SKIN: Skin exam is warm and dry

## 2024-09-20 NOTE — H&P ADULT - NSHPLABSRESULTS_GEN_ALL_CORE
LABS:                        10.9   14.67 )-----------( 291      ( 20 Sep 2024 00:00 )             34.7     HCG Quantitative, Serum: 31924.0 mIU/mL (24 @ 00:00)          133[L]  |  102  |  13  ----------------------------<  70  4.2   |  20  |  0.5[L]    Ca    9.2      20 Sep 2024 00:00    TPro  7.3  /  Alb  4.1  /  TBili  0.2  /  DBili  x   /  AST  20  /  ALT  28  /  AlkPhos  87        Urinalysis Basic - ( 20 Sep 2024 00:00 )    Color: Yellow / Appearance: Clear / S.028 / pH: x  Gluc: 70 mg/dL / Ketone: 15 mg/dL  / Bili: Negative / Urobili: 1.0 mg/dL   Blood: x / Protein: Trace mg/dL / Nitrite: Negative   Leuk Esterase: Moderate / RBC: 2 /HPF / WBC 26 /HPF   Sq Epi: x / Non Sq Epi: 3 /HPF / Bacteria: Occasional /HPF          RADIOLOGY & ADDITIONAL STUDIES:< from: US Transvaginal (24 @ 03:36) >      ACC: [id number] EXAM:  US TRANSVAGINAL   ORDERED BY: FREDDIE NAZARIO     PROCEDURE DATE:  2024          INTERPRETATION:  CLINICAL INFORMATION: Pelvic pain. Pregnancy.    LMP: 2024    Comparison made with pelvic ultrasound 2022.    TECHNIQUE:  Endovaginal and transabdominal pelvic sonogram. Color and Spectral   Doppler was performed.    FINDINGS:    Single live intrauterine pregnancy with estimated gestational age of 7   weeks and 6 days (crown-rump length 1.52 cm). Fetal cardiac activity   demonstrated at 175 bpm.  Small subchorionic hematoma, measuring up to 2.8 x 2.5 x 1.3 cm.    Gravid uterus measuring 9.8 x 5.4 x 6.3 cm.    Bilateral ovarian Doppler flow demonstrated.  Left 2.1 cm corpus luteum cyst.  Right ovary: 3.4 cm x 1.9 cm x 1.8 cm.  Left ovary: 3.8 cm x 2.4 cm x 2.3 cm.  No significant free pelvic fluid.    IMPRESSION:  Single live intrauterine pregnancy with estimated gestational age of 7   weeks and 6 days. Fetal cardiac activity demonstrated at 175 bpm.  Small subchorionic hematoma, measuring up to 2.8 x 2.5 x 1.3 cm.    No sonographic evidence of acute ovarian pathology.    --- End of Report ---            HORTENCIA HOLCOMB MD; Attending Radiologist  This document has been electronically signed. Sep 20 2024  3:40AM    < end of copied text >

## 2024-09-21 LAB
CULTURE RESULTS: SIGNIFICANT CHANGE UP
SPECIMEN SOURCE: SIGNIFICANT CHANGE UP

## 2024-09-21 RX ORDER — CEFTRIAXONE SODIUM 1 G
1000 VIAL (EA) INJECTION EVERY 24 HOURS
Refills: 0 | Status: DISCONTINUED | OUTPATIENT
Start: 2024-09-21 | End: 2024-09-22

## 2024-09-21 RX ADMIN — Medication 100 MILLIGRAM(S): at 13:46

## 2024-09-21 RX ADMIN — Medication 4 MILLIGRAM(S): at 21:24

## 2024-09-21 RX ADMIN — Medication 4 MILLIGRAM(S): at 09:16

## 2024-09-21 RX ADMIN — Medication 650 MILLIGRAM(S): at 20:07

## 2024-09-21 RX ADMIN — Medication 650 MILLIGRAM(S): at 13:46

## 2024-09-21 NOTE — PATIENT PROFILE ADULT - NSPRESCRALCFREQ_GEN_A_NUR
8/13/2021     Shane Ferreira   [address] [facility] [address]         Dear Shane:       Dr. Velasco would like you to schedule 1-2 months follow up appointment. Your care is important to us. Please call [phone] or go to [facility] to schedule your appointment.    Sincerely,          Lorne Velasco MD   [address] [place] [address]   Never Waleska Camacho

## 2024-09-21 NOTE — PROGRESS NOTE ADULT - SUBJECTIVE AND OBJECTIVE BOX
PGY 3 Note  Patient seen and examined at bedside. Endorses mild chills and lower back pain, she states the pain has improved but is still present. Pain well controlled on PO meds. Denies fever, CP, SOB, N/V, severe abdominal pain, heavy VB, urinary symptoms, or LE pain/swelling. Tolerating regular diet, voiding and ambulating without difficulty. + Flatus, denies BM.    Physical exam:    Vital Signs Last 24 Hrs  T(F): 98.1 (22 Sep 2024 00:00), Max: 98.4 (21 Sep 2024 08:05)  HR: 82 (22 Sep 2024 00:00) (82 - 94)  BP: 101/64 (22 Sep 2024 00:00) (94/58 - 101/64)  RR: 18 (22 Sep 2024 00:00) (18 - 18)  SpO2: 98% (22 Sep 2024 00:00) (96% - 98%)    Gen: alert, oriented  CVS: RRR  Lungs: CTAB  Abdomen: Soft, nontender, non distended. No rebound, rigidity or guarding. CVA tenderness bilaterally  Perineum: no active bleeding. Cohn in place, clear urine  Ext: No calf tenderness    f/u UO:     Diet: Regular    MEDICATIONS  (STANDING):  cefTRIAXone   IVPB 1000 milliGRAM(s) IV Intermittent every 24 hours  lactated ringers. 1000 milliLiter(s) (150 mL/Hr) IV Continuous <Continuous>    MEDICATIONS  (PRN):  acetaminophen     Tablet .. 650 milliGRAM(s) Oral every 6 hours PRN Temp greater or equal to 38C (100.4F), Mild Pain (1 - 3), Moderate Pain (4 - 6)  ondansetron Injectable 4 milliGRAM(s) IV Push three times a day PRN Nausea and/or Vomiting      LABS:                        10.4   9.34  )-----------( 263      ( 20 Sep 2024 17:03 )             33.1                         9.6    14.34 )-----------( 240      ( 20 Sep 2024 07:20 )             30.9       09-20-24 @ 07:20      135  |  106  |  9[L]  ----------------------------<  88  3.9   |  22  |  <0.5[L]    09-20-24 @ 00:00      133[L]  |  102  |  13  ----------------------------<  70  4.2   |  20  |  0.5[L]        Ca    8.3[L]      20 Sep 2024 07:20  Ca    9.2      20 Sep 2024 00:00  Phos  3.1     09-20    TPro  7.3  /  Alb  4.1  /  TBili  0.2  /  DBili  x   /  AST  20  /  ALT  28  /  AlkPhos  87  09-20-24 @ 00:00          Culture - Urine (collected 09-20-24 @ 00:00)  Source: Clean Catch Clean Catch (Midstream)  Final Report (09-21-24 @ 10:51):    >=3 organisms. Probable collection contamination.

## 2024-09-21 NOTE — PATIENT PROFILE ADULT - FUNCTIONAL ASSESSMENT - DAILY ACTIVITY SCORE.
No need to do anything with it.  If he is unable to cooperate with making symptom entries in it, that is fine just wear it consistently that's all he needs to do.  The computer will identify any irregular heartbeats that were looking for like A-fib.  Do not sweat it,  just wear it. Tx  
Pt called stated that they received the heart monitor. Pt wife says the monitor \"is not gonna work for her \" pt wife sates that machine is to high tech and the  wit short term memory will not be able to operate the machine properly. Please  contact pt wife regarding this matter and alternative solutions.   
Spoke with patient's wife Vesna (EC) who states patient is having a hard time remembering to bring the phone with him and will not remember to change the patch.  Encouraged her to try to have patient wear monitor as long as possible, but she is frustrated having to go over to his house frequently to remind him to charge the phone and carry it with him.  
24

## 2024-09-21 NOTE — PATIENT PROFILE ADULT - FUNCTIONAL ASSESSMENT - BASIC MOBILITY 6.
4-calculated by average/Not able to assess (calculate score using [facility] averaging method)

## 2024-09-21 NOTE — PATIENT PROFILE ADULT - FALL HARM RISK - HARM RISK INTERVENTIONS

## 2024-09-22 VITALS
SYSTOLIC BLOOD PRESSURE: 91 MMHG | OXYGEN SATURATION: 99 % | TEMPERATURE: 98 F | RESPIRATION RATE: 18 BRPM | HEART RATE: 83 BPM | DIASTOLIC BLOOD PRESSURE: 57 MMHG

## 2024-09-22 LAB
BASOPHILS # BLD AUTO: 0.02 K/UL — SIGNIFICANT CHANGE UP (ref 0–0.2)
BASOPHILS NFR BLD AUTO: 0.2 % — SIGNIFICANT CHANGE UP (ref 0–1)
EOSINOPHIL # BLD AUTO: 0.14 K/UL — SIGNIFICANT CHANGE UP (ref 0–0.7)
EOSINOPHIL NFR BLD AUTO: 1.4 % — SIGNIFICANT CHANGE UP (ref 0–8)
HCT VFR BLD CALC: 30.4 % — LOW (ref 37–47)
HGB BLD-MCNC: 9.5 G/DL — LOW (ref 12–16)
IMM GRANULOCYTES NFR BLD AUTO: 0.3 % — SIGNIFICANT CHANGE UP (ref 0.1–0.3)
LYMPHOCYTES # BLD AUTO: 2.7 K/UL — SIGNIFICANT CHANGE UP (ref 1.2–3.4)
LYMPHOCYTES # BLD AUTO: 26.8 % — SIGNIFICANT CHANGE UP (ref 20.5–51.1)
MCHC RBC-ENTMCNC: 24.3 PG — LOW (ref 27–31)
MCHC RBC-ENTMCNC: 31.3 G/DL — LOW (ref 32–37)
MCV RBC AUTO: 77.7 FL — LOW (ref 81–99)
MONOCYTES # BLD AUTO: 1 K/UL — HIGH (ref 0.1–0.6)
MONOCYTES NFR BLD AUTO: 9.9 % — HIGH (ref 1.7–9.3)
NEUTROPHILS # BLD AUTO: 6.19 K/UL — SIGNIFICANT CHANGE UP (ref 1.4–6.5)
NEUTROPHILS NFR BLD AUTO: 61.4 % — SIGNIFICANT CHANGE UP (ref 42.2–75.2)
NRBC # BLD: 0 /100 WBCS — SIGNIFICANT CHANGE UP (ref 0–0)
PLATELET # BLD AUTO: 249 K/UL — SIGNIFICANT CHANGE UP (ref 130–400)
PMV BLD: 12.2 FL — HIGH (ref 7.4–10.4)
RBC # BLD: 3.91 M/UL — LOW (ref 4.2–5.4)
RBC # FLD: 15.8 % — HIGH (ref 11.5–14.5)
WBC # BLD: 10.08 K/UL — SIGNIFICANT CHANGE UP (ref 4.8–10.8)
WBC # FLD AUTO: 10.08 K/UL — SIGNIFICANT CHANGE UP (ref 4.8–10.8)

## 2024-09-22 RX ORDER — CEPHALEXIN 500 MG
1 CAPSULE ORAL
Qty: 40 | Refills: 0
Start: 2024-09-22 | End: 2024-10-01

## 2024-09-22 RX ORDER — ACETAMINOPHEN 325 MG
2 TABLET ORAL
Qty: 112 | Refills: 0
Start: 2024-09-22 | End: 2024-10-05

## 2024-09-22 RX ORDER — METOCLOPRAMIDE HCL 5 MG
10 TABLET ORAL ONCE
Refills: 0 | Status: COMPLETED | OUTPATIENT
Start: 2024-09-22 | End: 2024-09-22

## 2024-09-22 RX ADMIN — Medication 100 MILLIGRAM(S): at 12:29

## 2024-09-22 RX ADMIN — Medication 10 MILLIGRAM(S): at 02:06

## 2024-09-22 NOTE — PROGRESS NOTE ADULT - ASSESSMENT
28y , @8w0d, with pyelonephritis.     - 1g Rocephin q24 hr  - IVF hydration   - regular diet   - Tylenol PRN for pain and fevers   - FHR on discharge     
28y , @8w1d, with pyelonephritis.     - 1g Rocephin q24 hr  - IVF hydration   - regular diet   - Tylenol PRN for pain and fevers   - FHR on discharge

## 2024-09-22 NOTE — DISCHARGE NOTE NURSING/CASE MANAGEMENT/SOCIAL WORK - PATIENT PORTAL LINK FT
You can access the FollowMyHealth Patient Portal offered by [facility] by registering at the following website: [email or web address]. By joining [facility]’s FollowMyHealth portal, you will also be able to view your health information using other applications (apps) compatible with our system.

## 2024-09-22 NOTE — DISCHARGE NOTE PROVIDER - NSDCMRMEDTOKEN_GEN_ALL_CORE_FT
cephalexin 500 mg oral tablet: 1 tab(s) orally every 6 hours  Tylenol 325 mg oral tablet: 2 tab(s) orally every 6 hours

## 2024-09-22 NOTE — DISCHARGE NOTE PROVIDER - CARE PROVIDER_API CALL
Cintia Arcos  Obstetrics and Gynecology  [address] [place] [address]  Phone: [phone]  Fax: [phone]  Follow Up Time: 1 week

## 2024-09-22 NOTE — PROGRESS NOTE ADULT - SUBJECTIVE AND OBJECTIVE BOX
PGY 2 Antepartum Note    Patient seen and examined at bedside in NAD. Reports pain has improved but still painful. Denies fever, CP, SOB, N/V, severe abdominal pain, heavy VB, urinary symptoms, or LE pain/swelling. Tolerating regular diet, voiding and ambulating without difficulty. + Flatus.  Gen: NAD  Cardio: rrr, s1/s2  Pulm: ca b/l  Abd: mildly tender at level of suprapubic region, no palpable contractions  MSK: bl CVA tenderness   Ext: no edema, no calf enderness  SVE: deferred    Medications:  acetaminophen     Tablet ..: 650 milliGRAM(s) (09-20-24 @ 00:05)  cefTRIAXone   IVPB: 100 mL/Hr (09-21-24 @ 13:46)  cefTRIAXone   IVPB: 100 mL/Hr (09-20-24 @ 04:00)  lactated ringers Bolus: 1000 mL/Hr (09-20-24 @ 04:34)  lactated ringers Bolus: 2000 mL/Hr (09-20-24 @ 04:10)  lactated ringers.: 150 mL/Hr (09-20-24 @ 05:32)  metoclopramide Injectable: 10 milliGRAM(s) (09-22-24 @ 02:06)  ondansetron Injectable: 4 milliGRAM(s) (09-20-24 @ 00:05)  sodium chloride 0.9% Bolus: 1000 mL/Hr (09-19-24 @ 22:30)      Labs:                        10.4   9.34  )-----------( 263      ( 20 Sep 2024 17:03 )             33.1     09-20    135  |  106  |  9[L]  ----------------------------<  88  3.9   |  22  |  <0.5[L]    Ca    8.3[L]      20 Sep 2024 07:20  Phos  3.1     09-20        Urinalysis Basic - ( 20 Sep 2024 07:20 )    Color: x / Appearance: x / SG: x / pH: x  Gluc: 88 mg/dL / Ketone: x  / Bili: x / Urobili: x   Blood: x / Protein: x / Nitrite: x   Leuk Esterase: x / RBC: x / WBC x   Sq Epi: x / Non Sq Epi: x / Bacteria: x

## 2024-09-22 NOTE — DISCHARGE NOTE PROVIDER - NSDCCPCAREPLAN_GEN_ALL_CORE_FT
PRINCIPAL DISCHARGE DIAGNOSIS  Diagnosis: Pyelonephritis during pregnancy in first trimester, antepartum  Assessment and Plan of Treatment:

## 2024-09-22 NOTE — DISCHARGE NOTE NURSING/CASE MANAGEMENT/SOCIAL WORK - NSDCPEFALRISK_GEN_ALL_CORE
For information on Fall & Injury Prevention, visit: [email or web address] OR  [email or web address] OR  https://www.cdc.gov/steadi/patient.html

## 2024-09-23 LAB
CULTURE RESULTS: NO GROWTH — SIGNIFICANT CHANGE UP
SPECIMEN SOURCE: SIGNIFICANT CHANGE UP

## 2024-09-27 DIAGNOSIS — B96.89 OTHER SPECIFIED BACTERIAL AGENTS AS THE CAUSE OF DISEASES CLASSIFIED ELSEWHERE: ICD-10-CM

## 2024-09-27 DIAGNOSIS — Z3A.01 LESS THAN 8 WEEKS GESTATION OF PREGNANCY: ICD-10-CM

## 2024-09-27 DIAGNOSIS — O23.01 INFECTIONS OF KIDNEY IN PREGNANCY, FIRST TRIMESTER: ICD-10-CM

## 2024-10-16 ENCOUNTER — APPOINTMENT (OUTPATIENT)
Dept: ANTEPARTUM | Facility: CLINIC | Age: 29
End: 2024-10-16

## 2024-11-20 ENCOUNTER — OUTPATIENT (OUTPATIENT)
Dept: INPATIENT UNIT | Facility: HOSPITAL | Age: 29
LOS: 1 days | Discharge: HOME | End: 2024-11-20
Payer: MEDICAID

## 2024-11-20 ENCOUNTER — EMERGENCY (EMERGENCY)
Facility: HOSPITAL | Age: 29
LOS: 0 days | Discharge: HOME | End: 2024-11-21
Attending: EMERGENCY MEDICINE
Payer: MEDICAID

## 2024-11-20 VITALS — DIASTOLIC BLOOD PRESSURE: 60 MMHG | SYSTOLIC BLOOD PRESSURE: 103 MMHG | HEART RATE: 81 BPM

## 2024-11-20 VITALS
WEIGHT: 184.97 LBS | DIASTOLIC BLOOD PRESSURE: 73 MMHG | HEART RATE: 98 BPM | TEMPERATURE: 98 F | SYSTOLIC BLOOD PRESSURE: 112 MMHG | HEIGHT: 63 IN | OXYGEN SATURATION: 99 % | RESPIRATION RATE: 18 BRPM

## 2024-11-20 VITALS
TEMPERATURE: 98 F | HEART RATE: 97 BPM | RESPIRATION RATE: 16 BRPM | SYSTOLIC BLOOD PRESSURE: 126 MMHG | DIASTOLIC BLOOD PRESSURE: 73 MMHG

## 2024-11-20 DIAGNOSIS — O26.899 OTHER SPECIFIED PREGNANCY RELATED CONDITIONS, UNSPECIFIED TRIMESTER: ICD-10-CM

## 2024-11-20 LAB
ALBUMIN SERPL ELPH-MCNC: 4 G/DL — SIGNIFICANT CHANGE UP (ref 3.5–5.2)
ALP SERPL-CCNC: 112 U/L — SIGNIFICANT CHANGE UP (ref 30–115)
ALT FLD-CCNC: 23 U/L — SIGNIFICANT CHANGE UP (ref 0–41)
ANION GAP SERPL CALC-SCNC: 14 MMOL/L — SIGNIFICANT CHANGE UP (ref 7–14)
APPEARANCE UR: ABNORMAL
AST SERPL-CCNC: 22 U/L — SIGNIFICANT CHANGE UP (ref 0–41)
BACTERIA # UR AUTO: ABNORMAL /HPF
BASOPHILS # BLD AUTO: 0.02 K/UL — SIGNIFICANT CHANGE UP (ref 0–0.2)
BASOPHILS NFR BLD AUTO: 0.2 % — SIGNIFICANT CHANGE UP (ref 0–1)
BILIRUB SERPL-MCNC: 0.3 MG/DL — SIGNIFICANT CHANGE UP (ref 0.2–1.2)
BILIRUB UR-MCNC: NEGATIVE — SIGNIFICANT CHANGE UP
BUN SERPL-MCNC: 6 MG/DL — LOW (ref 10–20)
CALCIUM SERPL-MCNC: 9.2 MG/DL — SIGNIFICANT CHANGE UP (ref 8.4–10.5)
CAST: 2 /LPF — SIGNIFICANT CHANGE UP (ref 0–4)
CHLORIDE SERPL-SCNC: 102 MMOL/L — SIGNIFICANT CHANGE UP (ref 98–110)
CO2 SERPL-SCNC: 18 MMOL/L — SIGNIFICANT CHANGE UP (ref 17–32)
COLOR SPEC: YELLOW — SIGNIFICANT CHANGE UP
CREAT SERPL-MCNC: <0.5 MG/DL — LOW (ref 0.7–1.5)
DIFF PNL FLD: NEGATIVE — SIGNIFICANT CHANGE UP
EGFR: 139 ML/MIN/1.73M2 — SIGNIFICANT CHANGE UP
EOSINOPHIL # BLD AUTO: 0.13 K/UL — SIGNIFICANT CHANGE UP (ref 0–0.7)
EOSINOPHIL NFR BLD AUTO: 1 % — SIGNIFICANT CHANGE UP (ref 0–8)
GLUCOSE SERPL-MCNC: 78 MG/DL — SIGNIFICANT CHANGE UP (ref 70–99)
GLUCOSE UR QL: NEGATIVE MG/DL — SIGNIFICANT CHANGE UP
HCT VFR BLD CALC: 32.3 % — LOW (ref 37–47)
HGB BLD-MCNC: 10.3 G/DL — LOW (ref 12–16)
IMM GRANULOCYTES NFR BLD AUTO: 0.4 % — HIGH (ref 0.1–0.3)
KETONES UR-MCNC: >=160 MG/DL
LEUKOCYTE ESTERASE UR-ACNC: NEGATIVE — SIGNIFICANT CHANGE UP
LYMPHOCYTES # BLD AUTO: 25.5 % — SIGNIFICANT CHANGE UP (ref 20.5–51.1)
LYMPHOCYTES # BLD AUTO: 3.17 K/UL — SIGNIFICANT CHANGE UP (ref 1.2–3.4)
MAGNESIUM SERPL-MCNC: 1.7 MG/DL — LOW (ref 1.8–2.4)
MCHC RBC-ENTMCNC: 24.9 PG — LOW (ref 27–31)
MCHC RBC-ENTMCNC: 31.9 G/DL — LOW (ref 32–37)
MCV RBC AUTO: 78 FL — LOW (ref 81–99)
MONOCYTES # BLD AUTO: 0.65 K/UL — HIGH (ref 0.1–0.6)
MONOCYTES NFR BLD AUTO: 5.2 % — SIGNIFICANT CHANGE UP (ref 1.7–9.3)
NEUTROPHILS # BLD AUTO: 8.39 K/UL — HIGH (ref 1.4–6.5)
NEUTROPHILS NFR BLD AUTO: 67.7 % — SIGNIFICANT CHANGE UP (ref 42.2–75.2)
NITRITE UR-MCNC: NEGATIVE — SIGNIFICANT CHANGE UP
NRBC # BLD: 0 /100 WBCS — SIGNIFICANT CHANGE UP (ref 0–0)
PH UR: 6 — SIGNIFICANT CHANGE UP (ref 5–8)
PHOSPHATE SERPL-MCNC: 4.2 MG/DL — SIGNIFICANT CHANGE UP (ref 2.1–4.9)
PLATELET # BLD AUTO: 242 K/UL — SIGNIFICANT CHANGE UP (ref 130–400)
PMV BLD: 12 FL — HIGH (ref 7.4–10.4)
POTASSIUM SERPL-MCNC: 4.5 MMOL/L — SIGNIFICANT CHANGE UP (ref 3.5–5)
POTASSIUM SERPL-SCNC: 4.5 MMOL/L — SIGNIFICANT CHANGE UP (ref 3.5–5)
PROT SERPL-MCNC: 7.1 G/DL — SIGNIFICANT CHANGE UP (ref 6–8)
PROT UR-MCNC: SIGNIFICANT CHANGE UP MG/DL
RBC # BLD: 4.14 M/UL — LOW (ref 4.2–5.4)
RBC # FLD: 15.1 % — HIGH (ref 11.5–14.5)
RBC CASTS # UR COMP ASSIST: 4 /HPF — SIGNIFICANT CHANGE UP (ref 0–4)
SODIUM SERPL-SCNC: 134 MMOL/L — LOW (ref 135–146)
SP GR SPEC: 1.03 — SIGNIFICANT CHANGE UP (ref 1–1.03)
SQUAMOUS # UR AUTO: 5 /HPF — SIGNIFICANT CHANGE UP (ref 0–5)
UROBILINOGEN FLD QL: 0.2 MG/DL — SIGNIFICANT CHANGE UP (ref 0.2–1)
WBC # BLD: 12.41 K/UL — HIGH (ref 4.8–10.8)
WBC # FLD AUTO: 12.41 K/UL — HIGH (ref 4.8–10.8)
WBC UR QL: 7 /HPF — HIGH (ref 0–5)

## 2024-11-20 PROCEDURE — 99214 OFFICE O/P EST MOD 30 MIN: CPT

## 2024-11-20 PROCEDURE — 96360 HYDRATION IV INFUSION INIT: CPT

## 2024-11-20 PROCEDURE — 80053 COMPREHEN METABOLIC PANEL: CPT

## 2024-11-20 PROCEDURE — 84100 ASSAY OF PHOSPHORUS: CPT

## 2024-11-20 PROCEDURE — 87077 CULTURE AEROBIC IDENTIFY: CPT

## 2024-11-20 PROCEDURE — 36415 COLL VENOUS BLD VENIPUNCTURE: CPT

## 2024-11-20 PROCEDURE — 96361 HYDRATE IV INFUSION ADD-ON: CPT

## 2024-11-20 PROCEDURE — 99282 EMERGENCY DEPT VISIT SF MDM: CPT

## 2024-11-20 PROCEDURE — 81001 URINALYSIS AUTO W/SCOPE: CPT

## 2024-11-20 PROCEDURE — 85025 COMPLETE CBC W/AUTO DIFF WBC: CPT

## 2024-11-20 PROCEDURE — 83735 ASSAY OF MAGNESIUM: CPT

## 2024-11-20 PROCEDURE — 99283 EMERGENCY DEPT VISIT LOW MDM: CPT

## 2024-11-20 PROCEDURE — 87086 URINE CULTURE/COLONY COUNT: CPT

## 2024-11-20 PROCEDURE — 0225U NFCT DS DNA&RNA 21 SARSCOV2: CPT

## 2024-11-20 RX ORDER — ACETAMINOPHEN 500 MG
650 TABLET ORAL ONCE
Refills: 0 | Status: COMPLETED | OUTPATIENT
Start: 2024-11-20 | End: 2024-11-20

## 2024-11-20 RX ORDER — ACETAMINOPHEN 500 MG
500 TABLET ORAL ONCE
Refills: 0 | Status: DISCONTINUED | OUTPATIENT
Start: 2024-11-20 | End: 2024-11-20

## 2024-11-20 RX ADMIN — Medication 650 MILLIGRAM(S): at 20:02

## 2024-11-20 RX ADMIN — Medication 650 MILLIGRAM(S): at 22:35

## 2024-11-20 RX ADMIN — Medication 125 MILLILITER(S): at 22:25

## 2024-11-20 NOTE — ED PROVIDER NOTE - ATTENDING CONTRIBUTION TO CARE
28-year-old female G3, P2 currently 17 weeks pregnant presenting for evaluation of bilateral lower back and lower abdominal pain for the past 3 days.  No associated complaints such as fever chills, vomiting, vaginal bleeding or discharge, dysuria. .  Patient appears well in no acute distress, no midline spine tenderness to palpation, there is tenderness to palpation of the lumbar paraspinal muscles, abdomen is soft, gravid uterus, no rebound or guarding, patient is ambulating without difficulties. She requested pain medications, dose of Tylenol was given, she was sent to L&D for further evaluation and care.

## 2024-11-20 NOTE — OB PROVIDER TRIAGE NOTE - NSOBPROVIDERNOTE_OBGYN_ALL_OB_FT
27yo  at 16w4d, PMhx prediabetes, with generalized complaints, r/o urinary infection v viral infection  - IVF LR bolus with maintenance  - Regular diet  - CBC CMP Mg Ph, Ua and Ucx from straight cath  - RVP swab  - Reevaluate 27yo  at 16w4d, PMhx prediabetes, with generalized complaints, r/o urinary infection v viral infection  - IVF LR bolus with maintenance  - Regular diet  - CBC CMP Mg Ph, Ua and Ucx from straight cath  - RVP swab  - Reevaluate    ADDENDUM: Pt s/p IVF hydration and PO tylenol, feeling much better. Labs below, unremarkable except for elevated ketones secondary to dehydration. RVP still pending and UCX straight cath still pending. Will continue her course of previously prescribed Macrobid, has a couple days left. Stable for d/c home with precautions.                           10.3   12.41 )-----------( 242      ( 2024 23:01 )             32.3       11-    134[L]  |  102  |  6[L]  ----------------------------<  78  4.5   |  18  |  <0.5[L]    Ca    9.2      2024 21:53  Phos  4.2     -  Mg     1.7     -    TPro  7.1  /  Alb  4.0  /  TBili  0.3  /  DBili  x   /  AST  22  /  ALT  23  /  AlkPhos  112  11-20          Urinalysis Basic - ( 2024 21:53 )  Color: Yellow / Appearance: Cloudy / S.028 / pH: x  Gluc: 78 mg/dL / Ketone: >=160 mg/dL  / Bili: Negative / Urobili: 0.2 mg/dL   Blood: x / Protein: Trace mg/dL / Nitrite: Negative   Leuk Esterase: Negative / RBC: 4 /HPF / WBC 7 /HPF   Sq Epi: x / Non Sq Epi: 5 /HPF / Bacteria: Occasional /HPF

## 2024-11-20 NOTE — OB PROVIDER TRIAGE NOTE - ADDITIONAL INSTRUCTIONS
Please continue your medications as prescribed by Dr Arcos.   Please stay hydrated, at least 8 cups of water per day. And eat at least 3 meals a day.   Follow up with your OBGYN at your next visit.

## 2024-11-20 NOTE — ED ADULT TRIAGE NOTE - BP NONINVASIVE DIASTOLIC (MM HG)
"[facility] [[id number]]  Chief Complaint   Patient presents with    RECHECK     Medication discussion      Initial Temp 97.8  F (36.6  C)   Wt 38 lb 9.3 oz (17.5 kg)  Estimated body mass index is 16.7 kg/m  as calculated from the following:    Height as of 1/12/24: 3' 3.37\" (100 cm).    Weight as of 1/12/24: 36 lb 13.1 oz (16.7 kg).  Medication Reconciliation: complete    Vee Camp[place], EMT             " 73

## 2024-11-20 NOTE — OB PROVIDER TRIAGE NOTE - HISTORY OF PRESENT ILLNESS
27yo  at 16w4d, [place] 5/3/24 by LMP (24) c/w early sonogram, presents to L&D via the ED with multiple concerns. For the last 3 days, she reports bilateral lower back pain that is sharp in nature that radiates to the front of her abdomen, feels suprapubic pain on palpation and when she has to urinate, as well as cramping in both lower extremities. Today it felt worse than usual which brought her to the hospital. She has not eaten anything today due to decreased appetite. And only had one cup of coffee, otherwise no PO hydration. Denies contractions/cramping, leakage of fluid, vaginal bleeding or discharge. Reports mild frontal headache, received 650mg PO tylenol in the ED before coming up which helped minimally. She was previously admitted to the hospital in Sept for suspected pyelo, in the end UCx was negative, but she reports these symptoms feel similar. She is currently on Macrobid prescribed by her OBGYN for UTI symptoms, as well as vaginal progesterone for "pelvic pain" that helps her significantly. Denies CP, SOB, cough, congestion, nausea, vomiting, fevers, chills, changes in bowel habits. Denies recent sick contacts or recent travel.     PMhx of prediabetes and GDM in her prior pregnancies.     Last PO Intake: cup of coffee in AM, last meal yesterday  Last Bowel Movement: yesterday, normal  Last Sexual [place]: 1m ago

## 2024-11-20 NOTE — ED PROVIDER NOTE - OBJECTIVE STATEMENT
28-year-old female G3, P2 estimated 17 weeks, presents for lower abdominal pain and bilateral side abdominal/flank pain for the past 3 days.  Denies any trauma or exertional activities.  Denies any vaginal bleeding, gushing of fluids, cramping, dysuria, hematuria, fevers, chills, shortness of breath.

## 2024-11-20 NOTE — ED ADULT NURSE NOTE - NSFALLUNIVINTERV_ED_ALL_ED
Bed/Stretcher in lowest position, wheels locked, appropriate side rails in place/Call bell, personal items and telephone in reach/Instruct patient to call for assistance before getting out of bed/chair/stretcher/Non-slip footwear applied when patient is off stretcher/[place] to call system/Physically safe environment - no spills, clutter or unnecessary equipment/Purposeful proactive rounding/Room/bathroom lighting operational, light cord in reach

## 2024-11-20 NOTE — OB RN TRIAGE NOTE - FALL HARM RISK - UNIVERSAL INTERVENTIONS
Bed in lowest position, wheels locked, appropriate side rails in place/Instruct patient to call for assistance before getting out of bed or chair/[place] to call system/Physically safe environment - no spills, clutter or unnecessary equipment

## 2024-11-20 NOTE — OB PROVIDER TRIAGE NOTE - NSHPPHYSICALEXAM_GEN_ALL_CORE
Physical exam:    Vital Signs Last 24 Hrs  T(F): 98.1 (20 Nov 2024 20:20), Max: 98.1 (20 Nov 2024 20:20)  HR: 97 (20 Nov 2024 20:25) (97 - 98)  BP: 126/73 (20 Nov 2024 20:25) (112/73 - 126/73)  RR: 16 (20 Nov 2024 20:20) (16 - 18)  SpO2: 99% (20 Nov 2024 18:39) (99% - 99%)    Gen: AAOx3, NAD  Heart: RRR, S1 S2 WNL  Lungs: CTAB  Back: Tenderness/discomfort/cramping with bilateral lower back deep palpation   Abdomen: Soft, nontender, no distension, gravid, suprapubic tenderness with deep palpation  Ext: No calf tenderness, no swelling, but on palpation around both legs patient reports "bilateral cramping pain"    Speculum: cervix appears closed and long, no bleeding, no pooling, no abnl discharge  SVE: 0/0/-3  BSS: variable presentation, gross fetal movement, FHR 158bpm

## 2024-11-21 LAB
RAPID RVP RESULT: SIGNIFICANT CHANGE UP
SARS-COV-2 RNA SPEC QL NAA+PROBE: SIGNIFICANT CHANGE UP

## 2024-11-22 DIAGNOSIS — O99.891 OTHER SPECIFIED DISEASES AND CONDITIONS COMPLICATING PREGNANCY: ICD-10-CM

## 2024-11-22 DIAGNOSIS — M54.50 LOW BACK PAIN, UNSPECIFIED: ICD-10-CM

## 2024-11-22 DIAGNOSIS — O99.282 ENDOCRINE, NUTRITIONAL AND METABOLIC DISEASES COMPLICATING PREGNANCY, SECOND TRIMESTER: ICD-10-CM

## 2024-11-22 DIAGNOSIS — E86.0 DEHYDRATION: ICD-10-CM

## 2024-11-22 DIAGNOSIS — M79.604 PAIN IN RIGHT LEG: ICD-10-CM

## 2024-11-22 DIAGNOSIS — O26.892 OTHER SPECIFIED PREGNANCY RELATED CONDITIONS, SECOND TRIMESTER: ICD-10-CM

## 2024-11-22 DIAGNOSIS — R10.9 UNSPECIFIED ABDOMINAL PAIN: ICD-10-CM

## 2024-11-22 DIAGNOSIS — R10.2 PELVIC AND PERINEAL PAIN: ICD-10-CM

## 2024-11-22 DIAGNOSIS — R82.4 ACETONURIA: ICD-10-CM

## 2024-11-22 DIAGNOSIS — R51.9 HEADACHE, UNSPECIFIED: ICD-10-CM

## 2024-11-22 DIAGNOSIS — Z3A.16 16 WEEKS GESTATION OF PREGNANCY: ICD-10-CM

## 2024-11-22 DIAGNOSIS — Z20.822 CONTACT WITH AND (SUSPECTED) EXPOSURE TO COVID-19: ICD-10-CM

## 2024-11-22 DIAGNOSIS — Z3A.17 17 WEEKS GESTATION OF PREGNANCY: ICD-10-CM

## 2024-11-22 LAB
CULTURE RESULTS: SIGNIFICANT CHANGE UP
SPECIMEN SOURCE: SIGNIFICANT CHANGE UP

## 2024-12-14 ENCOUNTER — OUTPATIENT (OUTPATIENT)
Dept: INPATIENT UNIT | Facility: HOSPITAL | Age: 29
LOS: 1 days | Discharge: HOME | End: 2024-12-14
Payer: MEDICAID

## 2024-12-14 VITALS — HEART RATE: 85 BPM | DIASTOLIC BLOOD PRESSURE: 64 MMHG | SYSTOLIC BLOOD PRESSURE: 101 MMHG

## 2024-12-14 VITALS
HEART RATE: 85 BPM | RESPIRATION RATE: 20 BRPM | DIASTOLIC BLOOD PRESSURE: 64 MMHG | TEMPERATURE: 99 F | SYSTOLIC BLOOD PRESSURE: 101 MMHG

## 2024-12-14 DIAGNOSIS — O26.899 OTHER SPECIFIED PREGNANCY RELATED CONDITIONS, UNSPECIFIED TRIMESTER: ICD-10-CM

## 2024-12-14 LAB
APPEARANCE UR: ABNORMAL
BACTERIA # UR AUTO: ABNORMAL /HPF
BILIRUB UR-MCNC: NEGATIVE — SIGNIFICANT CHANGE UP
CAST: 5 /LPF — HIGH (ref 0–4)
COLOR SPEC: YELLOW — SIGNIFICANT CHANGE UP
DIFF PNL FLD: NEGATIVE — SIGNIFICANT CHANGE UP
GLUCOSE UR QL: NEGATIVE MG/DL — SIGNIFICANT CHANGE UP
KETONES UR-MCNC: NEGATIVE MG/DL — SIGNIFICANT CHANGE UP
LEUKOCYTE ESTERASE UR-ACNC: ABNORMAL
NITRITE UR-MCNC: NEGATIVE — SIGNIFICANT CHANGE UP
PH UR: 7 — SIGNIFICANT CHANGE UP (ref 5–8)
PROT UR-MCNC: SIGNIFICANT CHANGE UP MG/DL
RBC CASTS # UR COMP ASSIST: 2 /HPF — SIGNIFICANT CHANGE UP (ref 0–4)
SP GR SPEC: 1.02 — SIGNIFICANT CHANGE UP (ref 1–1.03)
SQUAMOUS # UR AUTO: 16 /HPF — HIGH (ref 0–5)
UROBILINOGEN FLD QL: 1 MG/DL — SIGNIFICANT CHANGE UP (ref 0.2–1)
WBC UR QL: 277 /HPF — HIGH (ref 0–5)
YEAST-LIKE CELLS: PRESENT

## 2024-12-14 PROCEDURE — 99214 OFFICE O/P EST MOD 30 MIN: CPT

## 2024-12-14 PROCEDURE — 87086 URINE CULTURE/COLONY COUNT: CPT

## 2024-12-14 PROCEDURE — 87661 TRICHOMONAS VAGINALIS AMPLIF: CPT

## 2024-12-14 PROCEDURE — 87491 CHLMYD TRACH DNA AMP PROBE: CPT

## 2024-12-14 PROCEDURE — 87798 DETECT AGENT NOS DNA AMP: CPT

## 2024-12-14 PROCEDURE — 87801 DETECT AGNT MULT DNA AMPLI: CPT

## 2024-12-14 PROCEDURE — 87077 CULTURE AEROBIC IDENTIFY: CPT

## 2024-12-14 PROCEDURE — 87591 N.GONORRHOEAE DNA AMP PROB: CPT

## 2024-12-14 PROCEDURE — 81001 URINALYSIS AUTO W/SCOPE: CPT

## 2024-12-15 PROBLEM — R73.03 PREDIABETES: Chronic | Status: ACTIVE | Noted: 2024-11-20

## 2024-12-16 DIAGNOSIS — Z3A.20 20 WEEKS GESTATION OF PREGNANCY: ICD-10-CM

## 2024-12-16 DIAGNOSIS — Z86.32 PERSONAL HISTORY OF GESTATIONAL DIABETES: ICD-10-CM

## 2024-12-16 DIAGNOSIS — R30.0 DYSURIA: ICD-10-CM

## 2024-12-16 DIAGNOSIS — R10.2 PELVIC AND PERINEAL PAIN: ICD-10-CM

## 2024-12-16 DIAGNOSIS — O26.892 OTHER SPECIFIED PREGNANCY RELATED CONDITIONS, SECOND TRIMESTER: ICD-10-CM

## 2024-12-16 DIAGNOSIS — R39.11 HESITANCY OF MICTURITION: ICD-10-CM

## 2024-12-16 DIAGNOSIS — O99.891 OTHER SPECIFIED DISEASES AND CONDITIONS COMPLICATING PREGNANCY: ICD-10-CM

## 2024-12-16 LAB
CULTURE RESULTS: SIGNIFICANT CHANGE UP
SPECIMEN SOURCE: SIGNIFICANT CHANGE UP

## 2024-12-20 LAB
A VAGINAE DNA VAG QL NAA+PROBE: SIGNIFICANT CHANGE UP
BVAB2 DNA VAG QL NAA+PROBE: SIGNIFICANT CHANGE UP
C ALBICANS DNA VAG QL NAA+PROBE: NEGATIVE — SIGNIFICANT CHANGE UP
C GLABRATA DNA VAG QL NAA+PROBE: NEGATIVE — SIGNIFICANT CHANGE UP
C KRUSEI DNA VAG QL NAA+PROBE: NEGATIVE — SIGNIFICANT CHANGE UP
C LUSITANIAE DNA VAG QL NAA+PROBE: NEGATIVE — SIGNIFICANT CHANGE UP
C TRACH RRNA SPEC QL NAA+PROBE: NEGATIVE — SIGNIFICANT CHANGE UP
CANDIDA DNA VAG QL NAA+PROBE: NEGATIVE — SIGNIFICANT CHANGE UP
MEGA1 DNA VAG QL NAA+PROBE: SIGNIFICANT CHANGE UP
N GONORRHOEA RRNA SPEC QL NAA+PROBE: NEGATIVE — SIGNIFICANT CHANGE UP
T VAGINALIS RRNA SPEC QL NAA+PROBE: NEGATIVE — SIGNIFICANT CHANGE UP